# Patient Record
Sex: FEMALE | Race: BLACK OR AFRICAN AMERICAN | NOT HISPANIC OR LATINO | Employment: OTHER | URBAN - METROPOLITAN AREA
[De-identification: names, ages, dates, MRNs, and addresses within clinical notes are randomized per-mention and may not be internally consistent; named-entity substitution may affect disease eponyms.]

---

## 2021-03-19 ENCOUNTER — IMMUNIZATION (OUTPATIENT)
Dept: FAMILY MEDICINE | Facility: CLINIC | Age: 86
End: 2021-03-19
Payer: MEDICARE

## 2021-03-19 DIAGNOSIS — Z23 NEED FOR VACCINATION: Primary | ICD-10-CM

## 2021-03-19 PROCEDURE — 91300 COVID-19, MRNA, LNP-S, PF, 30 MCG/0.3 ML DOSE VACCINE: CPT | Mod: PBBFAC,PO

## 2021-04-09 ENCOUNTER — IMMUNIZATION (OUTPATIENT)
Dept: FAMILY MEDICINE | Facility: CLINIC | Age: 86
End: 2021-04-09
Payer: MEDICARE

## 2021-04-09 DIAGNOSIS — Z23 NEED FOR VACCINATION: Primary | ICD-10-CM

## 2021-04-09 PROCEDURE — 91300 COVID-19, MRNA, LNP-S, PF, 30 MCG/0.3 ML DOSE VACCINE: CPT | Mod: PBBFAC | Performed by: FAMILY MEDICINE

## 2021-04-09 PROCEDURE — 0002A COVID-19, MRNA, LNP-S, PF, 30 MCG/0.3 ML DOSE VACCINE: CPT | Mod: PBBFAC | Performed by: FAMILY MEDICINE

## 2022-01-26 ENCOUNTER — IMMUNIZATION (OUTPATIENT)
Dept: FAMILY MEDICINE | Facility: CLINIC | Age: 87
End: 2022-01-26
Payer: MEDICARE

## 2022-01-26 DIAGNOSIS — Z23 NEED FOR VACCINATION: Primary | ICD-10-CM

## 2022-01-26 PROCEDURE — 0004A COVID-19, MRNA, LNP-S, PF, 30 MCG/0.3 ML DOSE VACCINE: CPT | Mod: PBBFAC | Performed by: FAMILY MEDICINE

## 2024-07-25 ENCOUNTER — OFFICE VISIT (OUTPATIENT)
Dept: FAMILY MEDICINE | Facility: CLINIC | Age: 89
End: 2024-07-25
Payer: MEDICARE

## 2024-07-25 VITALS
HEIGHT: 65 IN | SYSTOLIC BLOOD PRESSURE: 138 MMHG | WEIGHT: 120.25 LBS | DIASTOLIC BLOOD PRESSURE: 62 MMHG | BODY MASS INDEX: 20.03 KG/M2 | HEART RATE: 71 BPM | OXYGEN SATURATION: 98 %

## 2024-07-25 DIAGNOSIS — K59.09 OTHER CONSTIPATION: ICD-10-CM

## 2024-07-25 DIAGNOSIS — F03.B4 MODERATE DEMENTIA WITH ANXIETY, UNSPECIFIED DEMENTIA TYPE: ICD-10-CM

## 2024-07-25 DIAGNOSIS — I10 ESSENTIAL HYPERTENSION: Primary | ICD-10-CM

## 2024-07-25 DIAGNOSIS — R41.3 MEMORY LOSS: ICD-10-CM

## 2024-07-25 DIAGNOSIS — E78.49 OTHER HYPERLIPIDEMIA: ICD-10-CM

## 2024-07-25 PROCEDURE — 99999 PR PBB SHADOW E&M-EST. PATIENT-LVL IV: CPT | Mod: PBBFAC,,, | Performed by: FAMILY MEDICINE

## 2024-07-25 RX ORDER — OXYBUTYNIN CHLORIDE 15 MG/1
15 TABLET, EXTENDED RELEASE ORAL
COMMUNITY
Start: 2024-05-18

## 2024-07-25 RX ORDER — DONEPEZIL HYDROCHLORIDE 5 MG/1
5 TABLET, FILM COATED ORAL
COMMUNITY
Start: 2024-05-20

## 2024-07-25 NOTE — PROGRESS NOTES
Assessment:       1. Essential hypertension    2. Other hyperlipidemia    3. Memory loss    4. Moderate dementia with anxiety, unspecified dementia type    5. Other constipation        Assessment & Plan  Essential hypertension: Stable    Other hyperlipidemia: New problem workup needed  -     Lipid Panel; Future; Expected date: 07/25/2024    Memory loss: New problem workup needed  -     Homocysteine, serum; Future; Expected date: 07/25/2024  -     Sedimentation rate; Future; Expected date: 07/25/2024  -     TSH; Future; Expected date: 07/25/2024  -     Vitamin B12; Future; Expected date: 07/25/2024  -     CBC Without Differential; Future; Expected date: 07/25/2024  -     Comprehensive Metabolic Panel; Future; Expected date: 07/25/2024  -     Vitamin B1; Future; Expected date: 07/25/2024  -     Folate; Future; Expected date: 07/25/2024  -     Treponema Pallidium Antibodies IgG, IgM; Future; Expected date: 07/25/2024    Moderate dementia with anxiety, unspecified dementia type: New problem workup needed  -     Ambulatory referral/consult to Home Health; Future; Expected date: 07/26/2024    Other constipation: New problem workup needed  -     linaCLOtide (LINZESS) 72 mcg Cap capsule; Take 1 capsule (72 mcg total) by mouth before breakfast.  Dispense: 30 capsule; Refill: 2    Other orders  -     (In Office Administered) Pneumococcal Conjugate Vaccine (20 Valent) (IM) (Preferred)         Her blood pressure, oxygen levels, and heart rate are within normal limits. She is due for a pneumonia vaccine. Pneumonia vaccine was administered today. Comprehensive blood work was ordered, including B12, folic acid, and thyroid function tests. She was advised to fast for at least 8 to 9 hours prior to the blood work. Home health therapy was suggested for her deconditioning, back pain, and memory issues. A  was suggested for her memory issues. Should her blood work return normal, an MRI or CT scan of the brain or CT scan  will be considered.  A small dosage of Linzess 72 mg once daily in the morning was prescribed. She was advised to increase her fiber intake and water intake.    Follow-up  A follow-up visit is scheduled for 3 months.          Patient agreed with assessment and plan. Patient verbalized understanding.     Subjective:       Patient ID: Anahy Stearns is a 89 y.o. female.    Chief Complaint: Establish Care    HPI  History of Present Illness  The patient is a 89-year-old female who presents for establishment of care. She is accompanied by her daughter.    She experiences intermittent headaches, which are relieved by aspirin. Additionally, she experiences lower back pain, which does not radiate. She occasionally experiences dizziness. Her memory is generally good, although she has difficulty recalling recent events. She occasionally experiences a sore throat and feels hot, though she does not have a fever. She prefers not to stay alone. Her back pain has been ongoing for approximately 2 years. She does not take Tylenol, but uses lidocaine patches as needed. Her water intake is insufficient. She has been experiencing memory issues for at least a year, but has not been formally diagnosed with dementia. She denies feeling sleepy or anxious. She occasionally experiences balance issues and uses a walker for support.    She takes milk of magnesia for constipation.    Supplemental Information  She was diagnosed with diabetes a while back.    SOCIAL HISTORY  She has never smoked. She never took drugs. She does not drink alcohol.       Past medical history, past social history, past surgical history, past Family history was reviewed and discussed with the patient's daughter.    Review of Systems   Constitutional:  Positive for activity change. Negative for appetite change.   HENT:  Negative for congestion and ear discharge.    Eyes:  Negative for discharge and itching.   Respiratory:  Negative for choking and chest tightness.     Cardiovascular:  Negative for chest pain and palpitations.   Gastrointestinal:  Positive for constipation. Negative for abdominal distention and abdominal pain.   Endocrine: Negative for cold intolerance and heat intolerance.   Genitourinary:  Negative for dysuria and flank pain.   Musculoskeletal:  Negative for back pain.   Skin:  Negative for pallor and rash.   Allergic/Immunologic: Negative for environmental allergies and food allergies.   Neurological:  Positive for dizziness and weakness. Negative for facial asymmetry and headaches.   Hematological:  Negative for adenopathy.   Psychiatric/Behavioral:  Positive for agitation, confusion, decreased concentration, dysphoric mood and sleep disturbance. The patient is nervous/anxious.        Objective:      Physical Exam  Vitals and nursing note reviewed.   Constitutional:       General: She is not in acute distress.     Appearance: Normal appearance. She is well-developed and normal weight. She is not diaphoretic.      Comments: The patient has limited ambulation, she is using a walker for mobilization.   HENT:      Head: Normocephalic and atraumatic.      Right Ear: External ear normal.      Left Ear: External ear normal.      Nose: Nose normal.      Mouth/Throat:      Pharynx: No oropharyngeal exudate.   Eyes:      General: No scleral icterus.        Right eye: No discharge.         Left eye: No discharge.      Conjunctiva/sclera: Conjunctivae normal.      Pupils: Pupils are equal, round, and reactive to light.   Cardiovascular:      Rate and Rhythm: Normal rate and regular rhythm.      Heart sounds: Normal heart sounds.   Pulmonary:      Effort: Pulmonary effort is normal. No respiratory distress.      Breath sounds: Normal breath sounds. No wheezing.   Abdominal:      General: Abdomen is flat. Bowel sounds are normal. There is no distension.      Palpations: Abdomen is soft.      Tenderness: There is no abdominal tenderness.   Musculoskeletal:         General:  No deformity.      Cervical back: Neck supple.   Neurological:      Mental Status: She is alert.      Cranial Nerves: No cranial nerve deficit.   Psychiatric:         Behavior: Behavior normal.         Thought Content: Thought content normal.         Cognition and Memory: Cognition is impaired. Memory is impaired. She exhibits impaired recent memory.         Judgment: Judgment normal.         Physical Exam  Vital Signs  Vitals show blood pressure at 138/62. Oxygen levels are normal. Heart rate is good. Height is 5 feet 5 inches. Weight is 54.5 kg, which is 120 pounds.     Results       This note was generated with the assistance of ambient listening technology. Verbal consent was obtained by the patient and accompanying visitor(s) for the recording of patient appointment to facilitate this note. I attest to having reviewed and edited the generated note for accuracy, though some syntax or spelling errors may persist. Please contact the author of this note for any clarification.

## 2024-07-26 ENCOUNTER — LAB VISIT (OUTPATIENT)
Dept: LAB | Facility: HOSPITAL | Age: 89
End: 2024-07-26
Attending: FAMILY MEDICINE
Payer: MEDICARE

## 2024-07-26 DIAGNOSIS — R41.3 MEMORY LOSS: ICD-10-CM

## 2024-07-26 DIAGNOSIS — E78.49 OTHER HYPERLIPIDEMIA: ICD-10-CM

## 2024-07-26 LAB
ALBUMIN SERPL BCP-MCNC: 3.8 G/DL (ref 3.5–5.2)
ALP SERPL-CCNC: 94 U/L (ref 55–135)
ALT SERPL W/O P-5'-P-CCNC: 13 U/L (ref 10–44)
ANION GAP SERPL CALC-SCNC: 9 MMOL/L (ref 8–16)
AST SERPL-CCNC: 24 U/L (ref 10–40)
BILIRUB SERPL-MCNC: 0.6 MG/DL (ref 0.1–1)
BUN SERPL-MCNC: 10 MG/DL (ref 8–23)
CALCIUM SERPL-MCNC: 10.1 MG/DL (ref 8.7–10.5)
CHLORIDE SERPL-SCNC: 107 MMOL/L (ref 95–110)
CHOLEST SERPL-MCNC: 263 MG/DL (ref 120–199)
CHOLEST/HDLC SERPL: 3.3 {RATIO} (ref 2–5)
CO2 SERPL-SCNC: 27 MMOL/L (ref 23–29)
CREAT SERPL-MCNC: 0.8 MG/DL (ref 0.5–1.4)
ERYTHROCYTE [DISTWIDTH] IN BLOOD BY AUTOMATED COUNT: 15.1 % (ref 11.5–14.5)
ERYTHROCYTE [SEDIMENTATION RATE] IN BLOOD BY PHOTOMETRIC METHOD: 5 MM/HR (ref 0–36)
EST. GFR  (NO RACE VARIABLE): >60 ML/MIN/1.73 M^2
FOLATE SERPL-MCNC: 13.3 NG/ML (ref 4–24)
GLUCOSE SERPL-MCNC: 81 MG/DL (ref 70–110)
HCT VFR BLD AUTO: 43.7 % (ref 37–48.5)
HCYS SERPL-SCNC: 8.4 UMOL/L (ref 4–15.5)
HDLC SERPL-MCNC: 80 MG/DL (ref 40–75)
HDLC SERPL: 30.4 % (ref 20–50)
HGB BLD-MCNC: 13.1 G/DL (ref 12–16)
LDLC SERPL CALC-MCNC: 160.8 MG/DL (ref 63–159)
MCH RBC QN AUTO: 28.5 PG (ref 27–31)
MCHC RBC AUTO-ENTMCNC: 30 G/DL (ref 32–36)
MCV RBC AUTO: 95 FL (ref 82–98)
NONHDLC SERPL-MCNC: 183 MG/DL
PLATELET # BLD AUTO: 268 K/UL (ref 150–450)
PMV BLD AUTO: 10.7 FL (ref 9.2–12.9)
POTASSIUM SERPL-SCNC: 5.2 MMOL/L (ref 3.5–5.1)
PROT SERPL-MCNC: 7 G/DL (ref 6–8.4)
RBC # BLD AUTO: 4.59 M/UL (ref 4–5.4)
SODIUM SERPL-SCNC: 143 MMOL/L (ref 136–145)
TREPONEMA PALLIDUM IGG+IGM AB [PRESENCE] IN SERUM OR PLASMA BY IMMUNOASSAY: NONREACTIVE
TRIGL SERPL-MCNC: 111 MG/DL (ref 30–150)
TSH SERPL DL<=0.005 MIU/L-ACNC: 0.47 UIU/ML (ref 0.4–4)
VIT B12 SERPL-MCNC: 604 PG/ML (ref 210–950)
WBC # BLD AUTO: 6.62 K/UL (ref 3.9–12.7)

## 2024-07-26 PROCEDURE — 84443 ASSAY THYROID STIM HORMONE: CPT | Performed by: FAMILY MEDICINE

## 2024-07-26 PROCEDURE — 36415 COLL VENOUS BLD VENIPUNCTURE: CPT | Mod: PO | Performed by: FAMILY MEDICINE

## 2024-07-26 PROCEDURE — 80061 LIPID PANEL: CPT | Performed by: FAMILY MEDICINE

## 2024-07-26 PROCEDURE — 80053 COMPREHEN METABOLIC PANEL: CPT | Performed by: FAMILY MEDICINE

## 2024-07-26 PROCEDURE — 86593 SYPHILIS TEST NON-TREP QUANT: CPT | Performed by: FAMILY MEDICINE

## 2024-07-26 PROCEDURE — 82607 VITAMIN B-12: CPT | Performed by: FAMILY MEDICINE

## 2024-07-26 PROCEDURE — 85652 RBC SED RATE AUTOMATED: CPT | Performed by: FAMILY MEDICINE

## 2024-07-26 PROCEDURE — 82746 ASSAY OF FOLIC ACID SERUM: CPT | Performed by: FAMILY MEDICINE

## 2024-07-26 PROCEDURE — 85027 COMPLETE CBC AUTOMATED: CPT | Performed by: FAMILY MEDICINE

## 2024-07-26 PROCEDURE — 84425 ASSAY OF VITAMIN B-1: CPT | Performed by: FAMILY MEDICINE

## 2024-07-26 PROCEDURE — 83090 ASSAY OF HOMOCYSTEINE: CPT | Performed by: FAMILY MEDICINE

## 2024-07-27 PROCEDURE — G0180 MD CERTIFICATION HHA PATIENT: HCPCS | Mod: ,,, | Performed by: FAMILY MEDICINE

## 2024-07-29 ENCOUNTER — TELEPHONE (OUTPATIENT)
Dept: FAMILY MEDICINE | Facility: CLINIC | Age: 89
End: 2024-07-29
Payer: MEDICARE

## 2024-07-29 DIAGNOSIS — R41.3 OTHER AMNESIA: Primary | ICD-10-CM

## 2024-07-29 DIAGNOSIS — F03.B4 MODERATE DEMENTIA WITH ANXIETY, UNSPECIFIED DEMENTIA TYPE: ICD-10-CM

## 2024-07-29 DIAGNOSIS — R41.3 MEMORY LOSS: ICD-10-CM

## 2024-07-29 NOTE — TELEPHONE ENCOUNTER
Please notify patient's daughter that the results of the blood work showed normal hemoglobin, the potassium levels were slightly high.  Will recheck at her next office visit.    Cholesterol levels were high, I will recommend healthy habits, decrease sugar intake, eat more vegetables, more salads, drink more water.  Inflammatory levels were in good range.  I will recommend an MRI of the brain secondary to the patient having worsening memory problems.  Neurology and neuropsychology evaluation.  Please if you can schedule the patient.  Thank you.

## 2024-07-30 LAB — VIT B1 BLD-MCNC: 68 UG/L (ref 38–122)

## 2024-08-15 ENCOUNTER — EXTERNAL HOME HEALTH (OUTPATIENT)
Dept: HOME HEALTH SERVICES | Facility: HOSPITAL | Age: 89
End: 2024-08-15
Payer: MEDICARE

## 2024-08-22 ENCOUNTER — HOSPITAL ENCOUNTER (OUTPATIENT)
Dept: RADIOLOGY | Facility: HOSPITAL | Age: 89
Discharge: HOME | End: 2024-08-22
Attending: FAMILY MEDICINE
Payer: MEDICARE

## 2024-08-22 DIAGNOSIS — R41.3 OTHER AMNESIA: ICD-10-CM

## 2024-08-22 DIAGNOSIS — R41.3 MEMORY LOSS: Primary | ICD-10-CM

## 2024-08-22 PROCEDURE — 70551 MRI BRAIN STEM W/O DYE: CPT | Mod: 26,,, | Performed by: RADIOLOGY

## 2024-08-22 PROCEDURE — 70551 MRI BRAIN STEM W/O DYE: CPT | Mod: TC,PO

## 2024-09-09 ENCOUNTER — TELEPHONE (OUTPATIENT)
Dept: NEUROLOGY | Facility: CLINIC | Age: 89
End: 2024-09-09
Payer: MEDICARE

## 2024-09-30 NOTE — PROGRESS NOTES
NEUROPSYCHOLOGY CONSULT    Referral Information  Name: Anahy Stearns  MRN: [id number]  Age: 89 y.o.    : 3/15/1934  Race: Black or  Education: 16 years   Gender: Female   Handedness: Right     Referring Provider: Dr. Ashley Reid  Referral Reason/Medical Necessity: Neuropsychological evaluation to assess current cognitive functioning, aid in differential diagnosis, and provide treatment recommendations in the context of memory decline.  Consent/Emergency Plan: The patient expressed an understanding of the purpose of the evaluation and consented to all procedures. I informed the patient of limits to confidentiality and discussed an emergency plan.  Visit Type: In-person interview, testing, and treatment plan on 2024.   Sources of Information: The following was gathered from a clinical interview with Ms. Stearns, her daughter (Chaz Christiansen) and granddaughter (Waqar Dejesus) in a separate interview with her permission, and review of available medical records.  Billing table provided at the end of this note.      SUMMARY/TREATMENT PLAN   Results from the evaluation indicate the following diagnoses and treatment plan recommendations. The patient is likely able to follow a treatment plan with help from family.    Ms. Stearns is a 89 y.o. female with history of hypertension, hyperlipidemia, headaches. She is referred for a neuropsychological evaluation in the context of progressive memory decline with onset predating . Her family reports rapid forgetting, difficulty recognizing family members, day/night confusion, hallucinations (sees her  sisters and various children), delusions (believes people are stealing from her). She requires assistance for basic and instrumental ADLs. Brain MRI shows generalized volume loss. She is prescribed Aricept.     Premorbid abilities are estimated to be in the average range. She displays diffuse cognitive impairments in domains of memory (with  minimal benefit from cues or recognition format), naming and semantic fluency, visuoconstruction, and executive functioning. Temporal orientation is impaired. Attention, simple processing speed, and letter fluency are within normal limits. She denies elevated mood symptoms on questionnaires, although her family notices frequent dysphoria and agitation.     Her clinical presentation and cognitive profile are unfortunately suggestive of Alzheimer's disease. She meets criteria for major neurocognitive disorder, moderate severity, with behavioral disturbance (agitation, hallucinations). She requires 24-hour supervision. There are safety concerns (leaving the house unattended, poor judgment and falls) that indicate a higher level of care is required and assistance is needed for her caregivers. It is possible that high risk medications are exacerbating deficits (oxybutynin and gabapentin), which warrants medication review.     PLAN & RECOMMENDATIONS    Medical Follow-Up: Continue usual follow up for current active medical issues.     Medication review. It may be worthwhile to reconcile medication and review for high-risk medications.    Recommend establishing care with Palliative Medicine.     Level of care: 24-hour supervision. Her family needs more assistance (e.g. sitters) and may wish to consider long term care residences with memory care. Family will be referred to Social Work for care management recommendation/needs. In the interim, safety risks should be addressed. Family should consider installing inside locks on the doors to prevent wandering out and/or alarms on the door to notify her great granddaughter when she attempts to leave. Given history of falls and poor judgment, a LifeAlert necklace or equivalent device is recommended.     Mood management. The most common treatment for depression in dementia involves a combination of medicine and non-pharmacological approaches. The following are behavioral strategies  recommended by the Alzheimer's Association (www.alz.org):   Schedule a predictable daily routine, taking advantage of the person's best time of day to undertake difficult tasks, such as bathing  Make a list of activities, people or places that the person enjoys and schedule these things more frequently  Help the person exercise regularly, particularly in the morning  Acknowledge the person's frustration or sadness, while continuing to express hope that he or she will feel better soon  Celebrate small successes and occasions  Find ways that the person can contribute to family life and be sure to recognize his or her contributions  Provide reassurance that the person is loved, respected and appreciated as part of the family, and not just for what she or he can do now  Nurture the person with offers of favorite foods or soothing or inspirational activities  Reassure the person that he or she will not be abandoned  Distraction and redirection to a new activity can help shift the focus away from negative emotions in moments of distress. By redirecting attention to a different activity or topic, caregivers can create a more positive and calming environment. Invite them to engage in an activity they enjoy or take a walk with you outside.    Practice good sleep hygiene. Tips to help with day/night confusion:  Help her maintain a consistent sleep/wake schedule.  Remind her to drink water earlier in the evening. Avoid drinking anything before bed.   Avoid spending time in bed during the day.  Ensure exposure to natural sunlight early in the day.  Maintain a cool and dark atmosphere in the bedroom.    Support groups for caregivers. Care partners can contact Aly Waters MA, MBA ([email or web address]) or Lashell Grijalva LCSW ([email or web address]) to learn more about [facility] Dementia Education Series via Zoom; Dementia Caregiver Support Group via Zoom; or volunteer-led in-person Dementia Caregiver Support  "Group.    Caregivers can optimize communication with the following strategies:  Unless there is an immediate safety issue, there is no need to challenge or correct your loved one. For example, if she is hallucinating, do not argue that what they are seeing is not real. If they are expressing paranoia, empathize gently with their fear, and attempt to redirect them to a different activity.  Avoid saying things like, "We already went over this!" "You can't keep doing this." "I already explained this to you"  Instead, simply nod calmly and acknowledge what the person is saying ("Yes, that makes sense."), and then request their help or company in a different behavior.   Keep communication simple and clear  If a statement is not understood, try rephrasing the statement in fewer words  Use close-ended questions that can be answered with yes or no   Avoid challenging her memory difficulties or trying to convince her when she is wrong  If she becomes upset or agitated, acknowledge her feelings and try to change the subject or direct her attention to a new activity  Respond with reassurance, using a calm voice and positive body language.    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 Stearns's care.  If you have any questions, please contact me at [phone].    Lisa Alonzo, Ph.D.  Licensed Clinical Neuropsychologist  [facility] - Department of Neurology    CLINICAL INTERVIEW & RECORD REVIEW   COGNITIVE SYMPTOMS & HISTORY OF PRESENT ILLNESS  Ms. " Daryn admits that she forgets numbers and names but denies concerns. Her daughter and granddaughter became more alarmed by memory issues in , although onset was before this with a gradual course. Memory deficits have progressed in the past three years. In the past year, she has tended to forget outings that occurred earlier in the day. Since December of last year, she has had trouble consistently recognizing her granddaughter and daughter. She thinks her great granddaughter who lives with her is still a teenager. She sometimes believes she is in her 60s. She moved in with her daughter briefly in 2024 but would repeatedly get lost in the house and became more confused. Family eventually moved her back to her home. She believes her  sisters are still alive and can take care of her. She can use a phone to call her sister only, because it is the most recent call. However, she does not put the phone to her ear when using it. She is never home by herself.     Safety concerns: Her daughter lives a block away and she will walk to her daughter's house before her great granddaughter realizes she left. Neighbors typically recognize her and walk with her or call her family. Last week she went to a park with her family and believed she could dance without her walker. This resulted in one fall and a couple near falls.     ACTIVITIES OF DAILY LIVING  Basic ADLs: She brushes her teeth independently. She refuses to bathe in the bathtub (they have a seat and bars for her) and instead uses a towel, soap, and washable cloth. Sometimes she will put clothes on backwards. She requires prompting to eat and often refuses food. She will say that she has already eaten and is full (when she did not eat). She may refuse to change out of her pajamas when they offer to take her somewhere.  Medications: Her family has managed her medication for the past 1-2 years.   Appointments/Schedule: Her family manages.   Finances: Her  granddaughter took this over two years ago.    Cooking: Her family fixes everything for her. She does not use the microwave.   Shopping/Household:  Family manages. She refuses to use the air conditioner in the summer.   Driving: Family has provided transportation for many years.     PHYSICAL SYMPTOMS  She uses a walker (for the last 2-3 years). Denies tremor. Denies trouble with hearing. Vision is reduced but wears glasses. She has chronic back pain which was aggravated from a recent fall, taking ibuprofen. She is taking gabapentin 300mg for many years. She has urinary incontinence. Denies UTIs. She is taking oxybutynin.     MOOD/PSYCHIATRIC HISTORY  Mood: She reports good mood. She worries about her back. Family notes that she cries throughout the day for the past year. She gets upset that her family does not take her anywhere. She gets agitated and can become mean, which was never her personality before. Her temperament changed last year.   Behavior: She will hide things around the house and will say that someone is coming to the house and stealing from her. This began last summer/. All her sisters are  and she sees them and speaks to them. Sometimes she realizes they are . She has frequently commented that there are many children in her house when it is only her and her great granddaughter.   Neurovegetative: She reports well, but her family notes that she wakes up at night and may walk around. She confuses day/night. Sometimes she will nap off and on all day. Appetite is reduced. She frequently says she is full and declines food.   Suicidal/Homicidal Ideation: Denied.     SOCIAL HISTORY AND HEALTH BEHAVIOR  Family Status: .  passed in . She has 7 sons and 1 daughter. Her daughter has POA.   Current Living Situation: Her 28-year old great granddaughter lives with her.   Primary Source of Support: Daughters, granddaughter, great granddaughter.   Daily Activities: Television.  Someimtes she watches TV in the dark the entire day.   Developmental History: Born in [place]. No gestational or later developmental concerns.  Academic History: No reported history of learning, attention, or behavioral difficulties. No grade retention.  Education Level: Obtained a degree in accounting. Her family believes it was a Bachelor's degree. The patient does not recall.   Occupational Status and History: Retired. Nursing,  for [employer].   Exercise: None.   Substance Use:   Alcohol: None.   Cigarettes/tobacco: None.     FAMILY HISTORY  family history includes Diabetes in her sister; Heart disease in her sister; Kidney failure in her sister.    MEDICAL STATUS  Patient Active Problem List   Diagnosis    Cholelithiasis     Past Medical History:   Diagnosis Date    Dementia     Hyperlipidemia     Hypertension      Past Surgical History:   Procedure Laterality Date    BACK SURGERY      BUNIONECTOMY       SECTION      CHOLECYSTECTOMY      HYSTERECTOMY      TONSILLECTOMY         RELEVANT NEUROLOGIC HISTORY  Falls: She had a couple falls a week ago. Had a fall at a park and was dancing with her walker and she fell backward. Reports that she also fell at home trying to move her rocking chair and hurt her back. Denies head impact.   TBI: None reported.  Seizures:  None.   Stroke: She and her daughters do not recall history of stroke. MRI shows remote lacunar infarct in the right thalamus.     NEURODIAGNOSTICS  Results for orders placed or performed during the hospital encounter of 24   MRI Brain Without Contrast    Narrative    EXAM:  MRI BRAIN WITHOUT CONTRAST    CLINICAL HISTORY:  Memory loss; Other amnesia.    COMPARISON:  None.    FINDINGS:  Intracranial contents:There is no acute abnormality.  There is no hemorrhage.  There is no mass.  There are no regions of restricted diffusion to suggest acute infarction.  There is generalized volume loss and mild nonspecific white  "matter change.  There is no abnormal extra-axial fluid collection.  There is a remote lacunar infarction in the right thalamus.  The basilar cisterns are open.  Flow voids indicating patency are present in the major vessels at the base of the brain.  The cerebellar tonsils are normal position.  Sellar structures are normal.  The patient has had bilateral lens replacement surgery.  The orbits are otherwise grossly normal.    Extracranial contents, calvarium, soft tissues:Baseline marrow signal is normal.  The paranasal sinuses and mastoid air cells are grossly clear.      Impression    1. There is generalized volume loss with only mild nonspecific white matter change.  There is no hemorrhage, mass or acute infarction      Electronically signed by: Aftab Dunham MD  Date:    08/22/2024  Time:    12:20       RECENT LABS  Lab Results   Component Value Date    [id number] 604 07/26/2024     No results found for: "RPR"  Lab Results   Component Value Date    FOLATE 13.3 07/26/2024     Lab Results   Component Value Date    TSH 0.471 07/26/2024     No results found for: "LABA1C", "HGBA1C"  No results found for: "HIV1X2", "[id number]"    CURRENT MEDICATIONS  Ms. Stearns has a current medication list which includes the following prescription(s): albuterol, amlodipine, aspirin, donepezil, gabapentin, linaclotide, omeprazole, oxybutynin, oxybutynin, and ramipril.     MENTAL STATUS AND OBSERVATIONS  Appearance: Casually dressed and adequate grooming/hygiene.   Alertness/orientation: Attentive and alert. She correctly identified the day of the week but was not oriented to year ("1920"), month, date, or season ("summer"). She was oriented to city but not building or floor.   Gait/motor: Presented in a wheelchair.   Sensory: She wore glasses. Vision and hearing appeared adequate for testing purposes.   Speech/language: Normal in rate, rhythm, tone, and volume. No significant word finding difficulty noted. Expressive and " "receptive language was normal.  Stated mood/affect: The patients stated mood was "good." She appeared impatient and eager to leave.   Interpersonal behavior: Rapport was established with reasonable effort.   Thought processes: Answered questions in a goal-directed manner, although her family frequently corrected her responses during the interview. For instance, she could not accurately recall her highest level of education (stated "high school," but daughter reports she earned a degree).   Test taking behavior and validity:   After approximately 45 minutes, she began to show an irritable edge, though she completed the evaluation.  Throughout the evaluation, she repetitively said she was not thinking straight and was not concentrating because she did not expect "all this."  She at times gave up easily, though she responded well to encouragement.   Scores on stand-alone and embedded performance validity measures were within expectation.  The current results are considered a valid reflection of the patient's current functioning.     PROCEDURES & RESULTS   In addition to performing a review of pertinent medical records, reviewing limits to confidentiality, conducting a clinical interview, and explaining procedures, the following measures were administered: Mini-Mental State Examination (MMSE); Wide Range Achievement Test, Fifth Edition (WRAT-5) Reading; [place] Verbal Learning Test, Second Edition (CVLT-II) short form; CERAD Constructional Praxis and Praxis Recall (Fillenbaum et al., 2011 norms); [place] Naming Test 15-item; Letter F and Animal Fluency; Clock Drawing Test; Wechsler Adult Intelligence Scale, Third Edition (WAIS-III) Digit Span; Trail Making Test; Geriatric Depression Scale (GDS-15); Generalized Anxiety Disorder Assessment (LUIS ANGEL-7); Test administration modifications and norms were based on Mary et al., 2019 norms unless otherwise specified.     TEST RESULTS  Performance is exceptionally low on a " cognitive screening measure (MMSE 17/30). Missed points on orientation, backwards spelling, repetition, following instructions, figure copy, and recall. Baseline cognitive function is estimated to be in the average range based on educational/occupational attainment. Immediate auditory attention is average. Working memory in the form of repetition of numbers backwards is exceptionally low. Visual tracking speed is exceptionally high. On a mental set shifting task with numbers and letters, she was unable to establish mental set to complete the sample items. Clock drawing to command is notable for errors in spatial arrangement of numbers of incorrect hand placement. Word reading is exceptionally low. Picture naming is below average with two perceptual errors and otherwise semantic errors. Semantic fluency is below average with frequent repetition errors. Letter fluency is low average. Copy of a clock is intact. Copy of a geometric figures is exceptionally low due to difficulty with overlapping rectangles and the necker cube. Learning efficiency is exceptionally low on a 9-item word list. She recalled two after a brief distractor task (exceptionally low) but could not recall any words after a 10-minute delay. Recognition discriminability is reduced (7 hits; 5 false positive errors). She recalled one figure she copied previously. Responses on self-report mood inventories suggest the absence of clinically significant symptoms of depression or anxiety.     PREMORBID FUNCTIONING Raw Score Standardized Score Percentile/CP Descriptor   WRAT-5 Reading 32 64.00 1 Exceptionally Low    COGNITIVE SCREENING Raw Score Standardized Score Percentile/CP Descriptor   MMSE 17 -5.00 - Exceptionally Low    Orientation - Place 0 - - -   Orientation - Date 0 - - -   LANGUAGE FUNCTIONING Raw Score Standardized Score Percentile/CP Descriptor   BNT-15 8 -1.59 6 Below Average   Letter F  8 -1.00 16 Low Average   Animal Naming 6 -2 3 Below  Average   VISUOSPATIAL FUNCTIONING Raw Score Standardized Score Percentile/CP Descriptor   Clock Request 5 - 100 WNL   CERAD Constructional Praxis 5 -5 0 Exceptionally Low    LEARNING & MEMORY Raw Score Standardized Score Percentile/CP Descriptor   CVLT-II Short        Trials 1-4  15 -2.9 - Exceptionally Low    Trial 1 2 -2.0 2 Below Average   Trial 4 5 -2.7 0 Exceptionally Low    SDFR 2 -4.1 0 Exceptionally Low    LDFR 0 -4.4 0 Exceptionally Low    LDCR 0 -4.5 0 Exceptionally Low    Recognition Hits 7 -1.2 12 Low Average   False Positives 5 3.4 100 Exceptionally High   Forced Choice 9 - - -   CERAD Praxis Recall 3 -2.03 2 Exceptionally Low    ATTENTION/WORKING MEMORY Raw Score Standardized Score Percentile/CP Descriptor   WAIS-III Digit Span 13 -0.44 33 Average         DS Forward 10 0.35 64 Average         DS Backward 3 -2.82 0 Exceptionally Low    MENTAL PROCESSING SPEED Raw Score Standardized Score Percentile/CP Descriptor   TMT A  114 2 98 Exceptionally High   TMT A errors 1 - - -   EXECUTIVE FUNCTIONING Raw Score Standardized Score Percentile/CP Descriptor   TMT B unable N/A N/A N/A   TMT B errors N/A - - -   Clock Request 5 0.59 44 WNL   MOOD & PERSONALITY Raw Score Standardized Score Percentile/CP Descriptor   GDS-15 2 - - WNL   LUIS ANGEL-7 0 - - WNL       It is important to note that scores/percentiles should only be interpreted by a neuropsychologist. It is common for healthy individuals to have 1-3 isolated low/unusual scores that are not indicative of any significant cognitive dysfunction.    BILLING     Service Description CPT Code Minutes Units   Psychiatric diagnostic evaluation by physician [id number]  0   Neurobehavioral status exam by physician [id number] 1   Each additional hour by physician [id number]  0   Test Evaluation Services --  --   Neuropsychological testing evaluation services by physician [id number] [phone] 1   Each additional hour by physician [id number] 60 1   Test Administration and Scoring --  --   Psychological or  neuropsychological test administration and scoring by physician [id number]  0   Each additional 30 minutes by physician [id number]  0   Psychological or neuropsychological test administration and scoring by technician [id number] 110 1   Each additional 30 minutes by technician [id number]  3

## 2024-10-01 ENCOUNTER — OFFICE VISIT (OUTPATIENT)
Dept: NEUROLOGY | Facility: CLINIC | Age: 89
End: 2024-10-01
Payer: MEDICARE

## 2024-10-01 DIAGNOSIS — R41.3 OTHER AMNESIA: ICD-10-CM

## 2024-10-01 DIAGNOSIS — G30.9 MAJOR NEUROCOGNITIVE DISORDER DUE TO ALZHEIMER DISEASE, WITH BEHAVIORAL DISTURBANCE: Primary | ICD-10-CM

## 2024-10-01 DIAGNOSIS — F03.B4 MODERATE DEMENTIA WITH ANXIETY, UNSPECIFIED DEMENTIA TYPE: ICD-10-CM

## 2024-10-01 DIAGNOSIS — R41.3 MEMORY LOSS: ICD-10-CM

## 2024-10-01 DIAGNOSIS — F02.818 MAJOR NEUROCOGNITIVE DISORDER DUE TO ALZHEIMER DISEASE, WITH BEHAVIORAL DISTURBANCE: Primary | ICD-10-CM

## 2024-10-01 PROCEDURE — 99999 PR PBB SHADOW E&M-EST. PATIENT-LVL II: CPT | Mod: PBBFAC,,, | Performed by: STUDENT IN AN ORGANIZED HEALTH CARE EDUCATION/TRAINING PROGRAM

## 2024-10-03 ENCOUNTER — OFFICE VISIT (OUTPATIENT)
Dept: NEUROLOGY | Facility: CLINIC | Age: 89
End: 2024-10-03
Payer: MEDICARE

## 2024-10-03 DIAGNOSIS — G30.9 MAJOR NEUROCOGNITIVE DISORDER DUE TO ALZHEIMER DISEASE, WITH BEHAVIORAL DISTURBANCE: Primary | ICD-10-CM

## 2024-10-03 DIAGNOSIS — F02.818 MAJOR NEUROCOGNITIVE DISORDER DUE TO ALZHEIMER DISEASE, WITH BEHAVIORAL DISTURBANCE: Primary | ICD-10-CM

## 2024-10-03 PROCEDURE — 99499 UNLISTED E&M SERVICE: CPT | Mod: S$GLB,,, | Performed by: STUDENT IN AN ORGANIZED HEALTH CARE EDUCATION/TRAINING PROGRAM

## 2024-10-03 NOTE — PROGRESS NOTES
NEUROPSYCHOLOGICAL EVALUATION FEEDBACK    Referral Information  Name: Anahy Stearns  MRN: [id number]  Age: 89 y.o.    : 3/15/1934  Race: Black or  Gender: female        Chief complaint leading to consultation/medical necessity: Feedback from neuropsychological evaluation.  Visit type: In person feedback  Total time spent with patient: 30 minutes.  Billin attached to testing visit on 10/01/2024.  Consent/Emergency Plan: The patient expressed an understanding of the purpose of the evaluation and consented to all procedures. I informed the patient of limits to confidentiality and discussed an emergency plan.    FEEDBACK NOTE       Ms. Anahy Stearns, her daughter, and her granddaughter participated in a feedback session today.  We discussed the results of the neuropsychological evaluation. I gave time to discuss questions and concerns. A copy of a evaluation report was provided to Ms. Stearns.     Lisa Alonzo, Ph.D.  Licensed Clinical Neuropsychologist  [facility] - Department of Neurology

## 2024-10-04 ENCOUNTER — OUTPATIENT CASE MANAGEMENT (OUTPATIENT)
Dept: NEUROLOGY | Facility: CLINIC | Age: 89
End: 2024-10-04
Payer: MEDICARE

## 2024-10-04 ENCOUNTER — TELEPHONE (OUTPATIENT)
Dept: NEUROLOGY | Facility: CLINIC | Age: 89
End: 2024-10-04
Payer: MEDICARE

## 2024-10-04 NOTE — TELEPHONE ENCOUNTER
----- Message from Lisa Alonzo sent at 10/4/2024  3:32 PM CDT -----  Regarding: Neurology referral  Balwinder Joseph,    I evaluated Ms. Stearns recently who I suspect has moderate dementia due to Alzheimer's disease. Her PCP referred her to Neurology in July and the patient's caregivers mentioned they had not heard about scheduling. I know you all have a long wait list as well - sending this message to make sure she doesn't fall through the cracks.     Best,  Lisa

## 2024-10-04 NOTE — TELEPHONE ENCOUNTER
Called patients daughter to schedule appointment for dementia. No answer. Left message to return call.

## 2024-10-04 NOTE — PROGRESS NOTES
Social Work - Dementia Care Management:    Referral received 10/3/24 from Dr. Alonzo to assess care management needs. Phoned caregiver, daughter Jada; left voice mail.

## 2024-10-08 ENCOUNTER — TELEPHONE (OUTPATIENT)
Dept: NEUROLOGY | Facility: CLINIC | Age: 89
End: 2024-10-08
Payer: MEDICARE

## 2024-10-08 NOTE — TELEPHONE ENCOUNTER
Spoke with patients daughter and scheduled new patient memory appointment for 2/17/25 with Elizabeth Dumont. Time/date/location provided.

## 2024-10-11 ENCOUNTER — OUTPATIENT CASE MANAGEMENT (OUTPATIENT)
Dept: NEUROLOGY | Facility: CLINIC | Age: 89
End: 2024-10-11
Payer: MEDICARE

## 2024-10-11 NOTE — PROGRESS NOTES
Social Work - Dementia Care Management:    Second attempt to reach caregiver, daughter Jada, to offer Dementia Care Management services; left voice mail.

## 2024-10-16 PROBLEM — R51.9 CHRONIC HEADACHES: Status: ACTIVE | Noted: 2024-10-16

## 2024-10-16 PROBLEM — R29.6 RECURRENT FALLS WHILE WALKING: Status: ACTIVE | Noted: 2024-10-16

## 2024-10-16 PROBLEM — Z75.8 DISCHARGE PLANNING ISSUES: Status: ACTIVE | Noted: 2024-10-16

## 2024-10-16 PROBLEM — Z71.89 ADVANCED CARE PLANNING/COUNSELING DISCUSSION: Status: ACTIVE | Noted: 2024-10-16

## 2024-10-16 PROBLEM — G89.29 CHRONIC HEADACHES: Status: ACTIVE | Noted: 2024-10-16

## 2024-10-16 PROBLEM — M54.50 ACUTE LOW BACK PAIN: Status: ACTIVE | Noted: 2024-10-16

## 2024-10-17 PROBLEM — Z74.09 OTHER REDUCED MOBILITY: Status: ACTIVE | Noted: 2024-10-17

## 2024-10-23 PROCEDURE — G0180 MD CERTIFICATION HHA PATIENT: HCPCS | Mod: ,,, | Performed by: FAMILY MEDICINE

## 2024-10-28 ENCOUNTER — E-CONSULT (OUTPATIENT)
Dept: ENDOCRINOLOGY | Facility: CLINIC | Age: 89
End: 2024-10-28
Payer: MEDICARE

## 2024-10-28 ENCOUNTER — OFFICE VISIT (OUTPATIENT)
Dept: FAMILY MEDICINE | Facility: CLINIC | Age: 89
End: 2024-10-28
Payer: MEDICARE

## 2024-10-28 VITALS
HEART RATE: 70 BPM | BODY MASS INDEX: 19.98 KG/M2 | SYSTOLIC BLOOD PRESSURE: 100 MMHG | WEIGHT: 119.94 LBS | RESPIRATION RATE: 18 BRPM | HEIGHT: 65 IN | OXYGEN SATURATION: 97 % | DIASTOLIC BLOOD PRESSURE: 58 MMHG

## 2024-10-28 DIAGNOSIS — E78.49 OTHER HYPERLIPIDEMIA: ICD-10-CM

## 2024-10-28 DIAGNOSIS — G47.09 OTHER INSOMNIA: ICD-10-CM

## 2024-10-28 DIAGNOSIS — Z23 IMMUNIZATION DUE: ICD-10-CM

## 2024-10-28 DIAGNOSIS — Z91.81 AT RISK FOR FALLING: ICD-10-CM

## 2024-10-28 DIAGNOSIS — M25.552 BILATERAL HIP PAIN: ICD-10-CM

## 2024-10-28 DIAGNOSIS — R93.5 ABNORMAL CT SCAN, PELVIS: Primary | ICD-10-CM

## 2024-10-28 DIAGNOSIS — I10 ESSENTIAL HYPERTENSION: Primary | ICD-10-CM

## 2024-10-28 DIAGNOSIS — R45.1 AGITATION: ICD-10-CM

## 2024-10-28 DIAGNOSIS — M25.551 BILATERAL HIP PAIN: ICD-10-CM

## 2024-10-28 DIAGNOSIS — R54 FRAIL ELDERLY: ICD-10-CM

## 2024-10-28 DIAGNOSIS — M89.9 BONE DISEASE: ICD-10-CM

## 2024-10-28 DIAGNOSIS — M88.9 PAGET DISEASE OF BONE: ICD-10-CM

## 2024-10-28 DIAGNOSIS — R74.8 ALKALINE PHOSPHATASE ELEVATION: ICD-10-CM

## 2024-10-28 DIAGNOSIS — D64.9 ANEMIA, UNSPECIFIED TYPE: ICD-10-CM

## 2024-10-28 PROCEDURE — 99999 PR PBB SHADOW E&M-EST. PATIENT-LVL IV: CPT | Mod: PBBFAC,,, | Performed by: FAMILY MEDICINE

## 2024-10-28 PROCEDURE — 90653 IIV ADJUVANT VACCINE IM: CPT | Mod: S$GLB,,, | Performed by: FAMILY MEDICINE

## 2024-10-28 PROCEDURE — 1126F AMNT PAIN NOTED NONE PRSNT: CPT | Mod: CPTII,S$GLB,, | Performed by: FAMILY MEDICINE

## 2024-10-28 PROCEDURE — G2211 COMPLEX E/M VISIT ADD ON: HCPCS | Mod: S$GLB,,, | Performed by: FAMILY MEDICINE

## 2024-10-28 PROCEDURE — 1159F MED LIST DOCD IN RCRD: CPT | Mod: CPTII,S$GLB,, | Performed by: FAMILY MEDICINE

## 2024-10-28 PROCEDURE — 99214 OFFICE O/P EST MOD 30 MIN: CPT | Mod: S$GLB,,, | Performed by: FAMILY MEDICINE

## 2024-10-28 PROCEDURE — G0008 ADMIN INFLUENZA VIRUS VAC: HCPCS | Mod: S$GLB,,, | Performed by: FAMILY MEDICINE

## 2024-10-28 PROCEDURE — 3288F FALL RISK ASSESSMENT DOCD: CPT | Mod: CPTII,S$GLB,, | Performed by: FAMILY MEDICINE

## 2024-10-28 PROCEDURE — 99451 NTRPROF PH1/NTRNET/EHR 5/>: CPT | Mod: S$GLB,,, | Performed by: STUDENT IN AN ORGANIZED HEALTH CARE EDUCATION/TRAINING PROGRAM

## 2024-10-28 PROCEDURE — 1101F PT FALLS ASSESS-DOCD LE1/YR: CPT | Mod: CPTII,S$GLB,, | Performed by: FAMILY MEDICINE

## 2024-10-28 RX ORDER — DICLOFENAC SODIUM 10 MG/G
2 GEL TOPICAL DAILY
Qty: 450 EACH | Refills: 2 | Status: SHIPPED | OUTPATIENT
Start: 2024-10-28

## 2024-10-28 RX ORDER — TRAZODONE HYDROCHLORIDE 50 MG/1
50 TABLET ORAL NIGHTLY PRN
Qty: 30 TABLET | Refills: 5 | Status: SHIPPED | OUTPATIENT
Start: 2024-10-28 | End: 2025-04-26

## 2024-10-30 ENCOUNTER — PATIENT MESSAGE (OUTPATIENT)
Dept: FAMILY MEDICINE | Facility: CLINIC | Age: 89
End: 2024-10-30
Payer: MEDICARE

## 2024-10-30 ENCOUNTER — TELEPHONE (OUTPATIENT)
Dept: ENDOCRINOLOGY | Facility: CLINIC | Age: 89
End: 2024-10-30
Payer: MEDICARE

## 2024-11-14 ENCOUNTER — TELEPHONE (OUTPATIENT)
Dept: FAMILY MEDICINE | Facility: CLINIC | Age: 89
End: 2024-11-14
Payer: MEDICARE

## 2024-11-14 DIAGNOSIS — G47.09 OTHER INSOMNIA: ICD-10-CM

## 2024-11-14 DIAGNOSIS — M25.551 BILATERAL HIP PAIN: ICD-10-CM

## 2024-11-14 DIAGNOSIS — M25.552 BILATERAL HIP PAIN: ICD-10-CM

## 2024-11-14 DIAGNOSIS — R45.1 AGITATION: ICD-10-CM

## 2024-11-14 NOTE — TELEPHONE ENCOUNTER
----- Message from Tomasa sent at 11/14/2024  4:00 PM CST -----  Type: Needs Medical Advice  Who Called:  [facility]     Best Call Back Number:     [facility] - [place], [place] - [address]  [address]  [facility] [address]  Phone: [phone] Fax: [phone]      Additional Information: following up on fax for pts traZODone (DESYREL) 50 MG tablet and diclofenac sodium (VOLTAREN) 1 % Gel please advise

## 2024-11-15 RX ORDER — DICLOFENAC SODIUM 10 MG/G
2 GEL TOPICAL DAILY
Qty: 450 EACH | Refills: 2 | Status: SHIPPED | OUTPATIENT
Start: 2024-11-15

## 2024-11-15 RX ORDER — TRAZODONE HYDROCHLORIDE 50 MG/1
50 TABLET ORAL NIGHTLY PRN
Qty: 90 TABLET | Refills: 3 | Status: SHIPPED | OUTPATIENT
Start: 2024-11-15 | End: 2025-11-15

## 2024-11-25 ENCOUNTER — EXTERNAL HOME HEALTH (OUTPATIENT)
Dept: HOME HEALTH SERVICES | Facility: HOSPITAL | Age: 89
End: 2024-11-25
Payer: MEDICARE

## 2024-12-30 ENCOUNTER — LAB VISIT (OUTPATIENT)
Dept: LAB | Facility: HOSPITAL | Age: 89
End: 2024-12-30
Attending: INTERNAL MEDICINE
Payer: MEDICARE

## 2024-12-30 ENCOUNTER — OFFICE VISIT (OUTPATIENT)
Dept: ENDOCRINOLOGY | Facility: CLINIC | Age: 89
End: 2024-12-30
Payer: MEDICARE

## 2024-12-30 VITALS — DIASTOLIC BLOOD PRESSURE: 60 MMHG | OXYGEN SATURATION: 97 % | SYSTOLIC BLOOD PRESSURE: 120 MMHG | HEART RATE: 64 BPM

## 2024-12-30 DIAGNOSIS — Z78.0 POSTMENOPAUSAL: ICD-10-CM

## 2024-12-30 DIAGNOSIS — R74.8 ELEVATED ALKALINE PHOSPHATASE LEVEL: ICD-10-CM

## 2024-12-30 DIAGNOSIS — E55.9 VITAMIN D DEFICIENCY: ICD-10-CM

## 2024-12-30 DIAGNOSIS — M88.9 PAGET'S DISEASE OF BONE: Primary | ICD-10-CM

## 2024-12-30 DIAGNOSIS — M88.9 PAGET DISEASE OF BONE: ICD-10-CM

## 2024-12-30 PROBLEM — G89.29 CHRONIC LOW BACK PAIN: Status: ACTIVE | Noted: 2024-10-16

## 2024-12-30 LAB
25(OH)D3+25(OH)D2 SERPL-MCNC: 61 NG/ML (ref 30–96)
ALBUMIN SERPL BCP-MCNC: 3.4 G/DL (ref 3.5–5.2)
ALP SERPL-CCNC: 90 U/L (ref 40–150)
ALT SERPL W/O P-5'-P-CCNC: 10 U/L (ref 10–44)
ANION GAP SERPL CALC-SCNC: 8 MMOL/L (ref 8–16)
AST SERPL-CCNC: 16 U/L (ref 10–40)
BILIRUB SERPL-MCNC: 0.5 MG/DL (ref 0.1–1)
BUN SERPL-MCNC: 11 MG/DL (ref 8–23)
CALCIUM SERPL-MCNC: 9.4 MG/DL (ref 8.7–10.5)
CHLORIDE SERPL-SCNC: 105 MMOL/L (ref 95–110)
CO2 SERPL-SCNC: 30 MMOL/L (ref 23–29)
CREAT SERPL-MCNC: 0.7 MG/DL (ref 0.5–1.4)
EST. GFR  (NO RACE VARIABLE): >60 ML/MIN/1.73 M^2
GGT SERPL-CCNC: 16 U/L (ref 8–55)
GLUCOSE SERPL-MCNC: 76 MG/DL (ref 70–110)
POTASSIUM SERPL-SCNC: 3.9 MMOL/L (ref 3.5–5.1)
PROT SERPL-MCNC: 6.4 G/DL (ref 6–8.4)
SODIUM SERPL-SCNC: 143 MMOL/L (ref 136–145)

## 2024-12-30 PROCEDURE — 82306 VITAMIN D 25 HYDROXY: CPT | Performed by: INTERNAL MEDICINE

## 2024-12-30 PROCEDURE — G2211 COMPLEX E/M VISIT ADD ON: HCPCS | Mod: S$GLB,,, | Performed by: INTERNAL MEDICINE

## 2024-12-30 PROCEDURE — 1125F AMNT PAIN NOTED PAIN PRSNT: CPT | Mod: CPTII,S$GLB,, | Performed by: INTERNAL MEDICINE

## 2024-12-30 PROCEDURE — 3288F FALL RISK ASSESSMENT DOCD: CPT | Mod: CPTII,S$GLB,, | Performed by: INTERNAL MEDICINE

## 2024-12-30 PROCEDURE — 84075 ASSAY ALKALINE PHOSPHATASE: CPT | Performed by: INTERNAL MEDICINE

## 2024-12-30 PROCEDURE — 99999 PR PBB SHADOW E&M-EST. PATIENT-LVL IV: CPT | Mod: PBBFAC,,, | Performed by: INTERNAL MEDICINE

## 2024-12-30 PROCEDURE — 80053 COMPREHEN METABOLIC PANEL: CPT | Performed by: INTERNAL MEDICINE

## 2024-12-30 PROCEDURE — 1160F RVW MEDS BY RX/DR IN RCRD: CPT | Mod: CPTII,S$GLB,, | Performed by: INTERNAL MEDICINE

## 2024-12-30 PROCEDURE — 99204 OFFICE O/P NEW MOD 45 MIN: CPT | Mod: S$GLB,,, | Performed by: INTERNAL MEDICINE

## 2024-12-30 PROCEDURE — 82977 ASSAY OF GGT: CPT | Performed by: INTERNAL MEDICINE

## 2024-12-30 PROCEDURE — 1159F MED LIST DOCD IN RCRD: CPT | Mod: CPTII,S$GLB,, | Performed by: INTERNAL MEDICINE

## 2024-12-30 PROCEDURE — 1101F PT FALLS ASSESS-DOCD LE1/YR: CPT | Mod: CPTII,S$GLB,, | Performed by: INTERNAL MEDICINE

## 2024-12-30 NOTE — ASSESSMENT & PLAN NOTE
Discussed that the chronic pain may improve if it is related to Paget's.  However, there are other etiologies of back pain that would be unlikely to improve with Reclast, such as arthritis.

## 2024-12-30 NOTE — PROGRESS NOTES
"NEW PATIENT VISIT    Subjective:      Chief Complaint: Paget's disease    HPI: Anahy Stearns is a 89 y.o. female who is here for Paget's disease      Past Medical History:   Diagnosis Date    Dementia     Hyperlipidemia     Hypertension          With regards to Paget's disease:     Patient reports long term issues with chronic low back pain and hip pain mostly on the right side.  Alkaline phosphatase was recently noted to be elevated in October.  She had a CT scan done around that time as well which showed changes in the left hip concerning for Paget's disease.  These were essentially unchanged since the prior scan done in 2008.  Records indicate the alkaline phosphatase elevation appears to be new although the earliest labs we have available go back to 2015.    Takes multivitamin.     No history of any types of malignancy in the past.    Fracture history:  None       Dental work planned? No - Has top and bottom dentures    Lab Results   Component Value Date    [id number] 61 12/30/2024    CALCIUM 9.4 12/30/2024    CALCIUM 9.2 10/18/2024    CALCIUM 9.1 10/17/2024    ALKPHOS 90 12/30/2024    ALKPHOS 162 (H) 10/18/2024    ALKPHOS 186 (H) 10/17/2024    TSH 0.471 07/26/2024         No results found for: "CTELOPEPTIDE", "PROCOLLAGEN"    No Prior DXAs      Objective:     Vitals:    12/30/24 0902   BP: 120/60   Pulse: 64         BP Readings from Last 5 Encounters:   12/30/24 120/60   10/28/24 (!) 100/58   10/18/24 103/85   07/25/24 138/62   02/08/24 (!) 156/68         Physical Exam  Vitals and nursing note reviewed.   Constitutional:       General: She is not in acute distress.     Appearance: She is well-developed. She is not ill-appearing.   HENT:      Head: Normocephalic and atraumatic.   Neck:      Thyroid: No thyromegaly.      Trachea: No tracheal deviation.   Pulmonary:      Effort: Pulmonary effort is normal. No respiratory distress.   Neurological:      Mental Status: She is alert and oriented to person, place, " and time.      Coordination: Coordination normal.      Comments: Sitting upright in wheelchair.  Interactive.   Psychiatric:         Mood and Affect: Mood normal.         Behavior: Behavior normal.           Wt Readings from Last 3 Encounters:   10/28/24 1349 54.4 kg (119 lb 14.9 oz)   10/16/24 1057 54.4 kg (120 lb)   07/25/24 0930 54.5 kg (120 lb 4.2 oz)       Assessment/Plan:     Paget's disease of bone  Radiographic findings along with elevated alkaline phosphatase are suggestive of Paget's disease.  We will check repeat alkaline phosphatase, gamma GT and bone specific alkaline phosphatase to confirm biochemical evidence of active Paget's and to rule out other causes of the alkaline phosphatase elevation such as liver disease.      If labs are still consistent with active Paget's will plan to treat with Reclast x1.  We discussed the potential side effects of Reclast.  They would want to have her infusion done in [employer] so we will need to arrange for that.    Check baseline bone density prior to treatment.    Chronic low back pain  Discussed that the chronic pain may improve if it is related to Paget's.  However, there are other etiologies of back pain that would be unlikely to improve with Reclast, such as arthritis.    Elevated alkaline phosphatase level  See above     Postmenopausal  Check DXA

## 2024-12-30 NOTE — ASSESSMENT & PLAN NOTE
Radiographic findings along with elevated alkaline phosphatase are suggestive of Paget's disease.  We will check repeat alkaline phosphatase, gamma GT and bone specific alkaline phosphatase to confirm biochemical evidence of active Paget's and to rule out other causes of the alkaline phosphatase elevation such as liver disease.      If labs are still consistent with active Paget's will plan to treat with Reclast x1.  We discussed the potential side effects of Reclast.  They would want to have her infusion done in [place] so we will need to arrange for that.    Check baseline bone density prior to treatment.

## 2025-01-02 LAB — ALP BONE SERPL-MCNC: 19.3 UG/L

## 2025-01-08 ENCOUNTER — HOSPITAL ENCOUNTER (OUTPATIENT)
Dept: RADIOLOGY | Facility: HOSPITAL | Age: OVER 89
Discharge: HOME | End: 2025-01-08
Attending: INTERNAL MEDICINE
Payer: MEDICARE

## 2025-01-08 DIAGNOSIS — Z78.0 POSTMENOPAUSAL: ICD-10-CM

## 2025-01-08 PROCEDURE — 77091 TBS TECHL CALCULATION ONLY: CPT | Mod: HCNC,PO

## 2025-01-08 PROCEDURE — 77092 TBS I&R FX RSK QHP: CPT | Mod: HCNC,,, | Performed by: RADIOLOGY

## 2025-01-08 PROCEDURE — 77080 DXA BONE DENSITY AXIAL: CPT | Mod: 26,HCNC,, | Performed by: RADIOLOGY

## 2025-01-14 ENCOUNTER — PATIENT MESSAGE (OUTPATIENT)
Dept: ENDOCRINOLOGY | Facility: CLINIC | Age: OVER 89
End: 2025-01-14
Payer: MEDICARE

## 2025-01-14 DIAGNOSIS — Z00.00 ENCOUNTER FOR MEDICARE ANNUAL WELLNESS EXAM: ICD-10-CM

## 2025-01-24 ENCOUNTER — TELEPHONE (OUTPATIENT)
Dept: ENDOCRINOLOGY | Facility: CLINIC | Age: OVER 89
End: 2025-01-24
Payer: MEDICARE

## 2025-01-28 ENCOUNTER — PATIENT MESSAGE (OUTPATIENT)
Dept: ENDOCRINOLOGY | Facility: CLINIC | Age: OVER 89
End: 2025-01-28
Payer: MEDICARE

## 2025-01-28 DIAGNOSIS — M81.0 POSTMENOPAUSAL OSTEOPOROSIS: Primary | ICD-10-CM

## 2025-01-28 RX ORDER — HEPARIN 100 UNIT/ML
500 SYRINGE INTRAVENOUS
OUTPATIENT
Start: 2025-01-28

## 2025-01-28 RX ORDER — ZOLEDRONIC ACID 5 MG/100ML
5 INJECTION, SOLUTION INTRAVENOUS
OUTPATIENT
Start: 2025-01-28

## 2025-01-28 RX ORDER — SODIUM CHLORIDE 0.9 % (FLUSH) 0.9 %
10 SYRINGE (ML) INJECTION
OUTPATIENT
Start: 2025-01-28

## 2025-01-31 ENCOUNTER — TELEPHONE (OUTPATIENT)
Dept: ADMINISTRATIVE | Facility: CLINIC | Age: OVER 89
End: 2025-01-31
Payer: MEDICARE

## 2025-02-07 ENCOUNTER — PATIENT MESSAGE (OUTPATIENT)
Dept: INFUSION THERAPY | Facility: HOSPITAL | Age: OVER 89
End: 2025-02-07
Payer: MEDICARE

## 2025-02-13 ENCOUNTER — OFFICE VISIT (OUTPATIENT)
Dept: FAMILY MEDICINE | Facility: CLINIC | Age: OVER 89
End: 2025-02-13
Payer: MEDICARE

## 2025-02-13 VITALS
DIASTOLIC BLOOD PRESSURE: 58 MMHG | WEIGHT: 115.94 LBS | SYSTOLIC BLOOD PRESSURE: 102 MMHG | OXYGEN SATURATION: 98 % | BODY MASS INDEX: 19.3 KG/M2 | HEART RATE: 65 BPM

## 2025-02-13 DIAGNOSIS — F03.B4 MODERATE DEMENTIA WITH ANXIETY, UNSPECIFIED DEMENTIA TYPE: Primary | ICD-10-CM

## 2025-02-13 DIAGNOSIS — L60.9 NAIL PROBLEM: ICD-10-CM

## 2025-02-13 DIAGNOSIS — M81.8 OTHER OSTEOPOROSIS WITHOUT CURRENT PATHOLOGICAL FRACTURE: ICD-10-CM

## 2025-02-13 DIAGNOSIS — I10 ESSENTIAL HYPERTENSION: ICD-10-CM

## 2025-02-13 DIAGNOSIS — M54.2 NECK PAIN, MUSCULOSKELETAL: ICD-10-CM

## 2025-02-13 DIAGNOSIS — M88.9 PAGET DISEASE OF BONE: ICD-10-CM

## 2025-02-13 DIAGNOSIS — E78.49 OTHER HYPERLIPIDEMIA: ICD-10-CM

## 2025-02-13 DIAGNOSIS — R60.0 LOCALIZED EDEMA: ICD-10-CM

## 2025-02-17 ENCOUNTER — OFFICE VISIT (OUTPATIENT)
Dept: NEUROLOGY | Facility: CLINIC | Age: OVER 89
End: 2025-02-17
Payer: MEDICARE

## 2025-02-17 VITALS
HEIGHT: 65 IN | HEART RATE: 79 BPM | DIASTOLIC BLOOD PRESSURE: 69 MMHG | BODY MASS INDEX: 19.6 KG/M2 | SYSTOLIC BLOOD PRESSURE: 132 MMHG | WEIGHT: 117.63 LBS

## 2025-02-17 DIAGNOSIS — F02.818 MAJOR NEUROCOGNITIVE DISORDER DUE TO ALZHEIMER DISEASE, WITH BEHAVIORAL DISTURBANCE: Primary | ICD-10-CM

## 2025-02-17 DIAGNOSIS — G30.9 MAJOR NEUROCOGNITIVE DISORDER DUE TO ALZHEIMER DISEASE, WITH BEHAVIORAL DISTURBANCE: Primary | ICD-10-CM

## 2025-02-17 NOTE — PROGRESS NOTES
NEUROLOGY  Outpatient Consultation Visit   [facility]  [address] [facility] [address], [place] [address]  [phone] (office) / [phone] (fax)    Patient Name:  Anahy Stearns  :  3/15/1934  MR #:  [id number]  Acct #:  [id number]    Date of Neurology Consult: 2025  Name of Provider: ARACELI Ngo    Other Physicians:  Ashley Reid MD (Primary Care Physician); Ashley Reid MD (Referring)      Chief Complaint: Memory Loss      History of Present Illness (HPI):  Anahy Stearns is a right handed  89 y.o. female here to establish care for alzheimer's dementia. Patient is here with daughter, Jada, who also contributes to the following history.     Patient's highest level of education was a bachelor's degree in accounting. She worked as an  for [employer].     Daughter and patient note forgetfulness particularly numbers and names. They first noticed this in  with the most significant progression over the last 3 years.     Daughter notes that she will get agitated. Patient denies feeling depressed. She does have hallucinations about people going through her purse and closet. Daughter does not feel that they are bothersome to her. Granddaughter is able to redirect her.      She is taking trazadone nightly for sleep. She will wander at night. Unclear how much she is sleeping at night. Daughter notes that she will sleep during the day. Daughter notes that she will get her days and nights confused.     She is still living in her house. Her grandaughter is living with her. She does spend some time during the day alone. Discussed that we recommend 24/7 supervision with family, sitters or possibly nursing home placement with memory care.    Denies any recent falls. She uses a walker.     Daughter and grand daughter are manging medications. Her granddaughter is managing her finances.     She sometimes requires assistance with dressing. She needs reminders to take a bath.      She is not cooking or using the kitchen.     She is not driving.     She spends a lot of time watching TV. She will sometimes wander out of the house trying to walk to her daughter's house a block away.       NP Testing 10/11/2024:     Ms. Stearns is a 89 y.o. female with history of hypertension, hyperlipidemia, headaches. She is referred for a neuropsychological evaluation in the context of progressive memory decline with onset predating . Her family reports rapid forgetting, difficulty recognizing family members, day/night confusion, hallucinations (sees her  sisters and various children), delusions (believes people are stealing from her). She requires assistance for basic and instrumental ADLs. Brain MRI shows generalized volume loss. She is prescribed Aricept.      Premorbid abilities are estimated to be in the average range. She displays diffuse cognitive impairments in domains of memory (with minimal benefit from cues or recognition format), naming and semantic fluency, visuoconstruction, and executive functioning. Temporal orientation is impaired. Attention, simple processing speed, and letter fluency are within normal limits. She denies elevated mood symptoms on questionnaires, although her family notices frequent dysphoria and agitation.      Her clinical presentation and cognitive profile are unfortunately suggestive of Alzheimer's disease. She meets criteria for major neurocognitive disorder, moderate severity, with behavioral disturbance (agitation, hallucinations). She requires 24-hour supervision. There are safety concerns (leaving the house unattended, poor judgment and falls) that indicate a higher level of care is required and assistance is needed for her caregivers. It is possible that high risk medications are exacerbating deficits (oxybutynin and gabapentin), which warrants medication review.       Treatment to date:  Aricept 5 mg nightly       Past Medical, Surgical, Family &  "Social History:   Past Medical History:   Diagnosis Date    Dementia     Hyperlipidemia     Hypertension      Past Surgical History:   Procedure Laterality Date    BACK SURGERY      BUNIONECTOMY       SECTION      CHOLECYSTECTOMY      HYSTERECTOMY      TONSILLECTOMY       Family History   Problem Relation Name Age of Onset    Heart disease Sister      Diabetes Sister      Kidney failure Sister       Alcohol use:  reports no history of alcohol use.   (Of note, 0.6 oz = 1 beer or 6 oz = 10 beers).  Tobacco use:  reports that she has never smoked. She has never used smokeless tobacco.  Street drug use:  reports no history of drug use.  Allergies: Patient has no known allergies..    Home Medications:   Current Medications[1]    Physical Examination:  /69 (BP Location: Left arm, Patient Position: Sitting)   Pulse 79   Ht 5' 5" (1.651 m)   Wt 53.4 kg (117 lb 9.9 oz)   BMI 19.57 kg/m²     Neurological exam:  Mental status: Awake and alert.  Oriented to person, place, time and situation. Recent and remote memory appear to be intact.  Fund of knowledge normal. Normal attention and concentration.   Speech/Language: Fluent and appropriate. No dysarthria or aphasia on conversation. Able to follow complex commands.   Cranial nerves (II-XII): Visual fields full. Pupils equal round and reactive to light, extraocular movements intact, no ptosis, no nystagmus. Facial sensation and symmetry intact bilaterally. Hearing grossly intact. Palate mobile and midline. Shoulder shrug normal bilaterally. Normal tongue protrusion.   Motor: 5 out of 5 strength throughout the upper and lower extremities bilaterally. Normal bulk and tone. No abnormal movements noted. No drift appreciated.   Sensation: Intact to light touch, pinprick, vibration and temperature bilaterally. Normal proprioception.   DTR: 2+ at the knees and biceps bilaterally.  Coordination: Finger-nose-finger testing intact bilaterally. TABATHA normal bilaterally. No " "tremor. Romberg absent.   Gait: Normal gait, including heel, toe and tandem.      Diagnostic Data Reviewed:   I have personally reviewed provider notes, labs and imaging made available to me today.     Imaging:  MRI Brain 8/22/24:   Personally reviewed. Mild white matter disease. Generalized atrophy.     Labs:  Lab Results   Component Value Date    WBC 5.05 10/18/2024    HGB 11.0 (L) 10/18/2024    HCT 33.5 (L) 10/18/2024     10/18/2024    MCV 90 10/18/2024    RDW 13.9 10/18/2024     Lab Results   Component Value Date     12/30/2024    K 3.9 12/30/2024     12/30/2024    CO2 30 (H) 12/30/2024    BUN 11 12/30/2024    CREATININE 0.7 12/30/2024    GLU 76 12/30/2024    CALCIUM 9.4 12/30/2024    MG 2.0 10/16/2024     Lab Results   Component Value Date    PROT 6.4 12/30/2024    ALBUMIN 3.4 (L) 12/30/2024    BILITOT 0.5 12/30/2024    AST 16 12/30/2024    ALKPHOS 90 12/30/2024    ALT 10 12/30/2024    GGT 16 12/30/2024     No results found for: "INR", "PROTIME", "PTT"  Lab Results   Component Value Date    CHOL 263 (H) 07/26/2024    HDL 80 (H) 07/26/2024    LDLCALC 160.8 (H) 07/26/2024    TRIG 111 07/26/2024    CHOLHDL 30.4 07/26/2024     No results found for: "LABA1C", "HGBA1C"   Lab Results   Component Value Date    [id number] 604 07/26/2024     Lab Results   Component Value Date    FOLATE 13.3 07/26/2024     Lab Results   Component Value Date    TSH 0.471 07/26/2024     No results found for: "VITAMINB1"  No results found for: "RPR"  No results found for: "AKI"  No components found for: "HEPATITISCANTIBODY"  No components found for: "HIV 1/2 AG/AB"  No results found for: "CRP"  No components found for: "SEDIMENTATIONRATE"      Assessment and Plan:  Anahy Stearns is a 89 y.o. female here to establish care for Alzheimer's dementia.       Problem List Items Addressed This Visit       Major neurocognitive disorder due to Alzheimer disease, with behavioral disturbance - Primary    Overview   NP Testing " 10/11/2024:     Alzheimer's disease. She meets criteria for major neurocognitive disorder, moderate severity, with behavioral disturbance          Current Assessment & Plan   -89 y/o female with with rapid forgetting, difficulty recognizing family members, confusion with days and nights and hallucinations. Additionally, she requires assistance with bathing, feeding, and dressing.    -Onset ~2021.   -Granddaughter is living with her. Daughter notes that she is spending some time during the day alone. Reinforced that patient requires 24/7 supervision for safety. Some safety concern with patient wandering. Encouraged the use of locks and safety measures in the house.  -Patient having some hallucinations about people in her purse and closet. Daughter notes that she is easily redirectable and patient notes that she is not bothered by this. Will continue to clinically monitor.   -Medication list reviewed. Recommend discussing alternative to oxybutynin with PCP. Would prefer Myrbetriq from neurological standpoint. Discussed risk vs benefit of gabapentin. Daughter notes significant improvement in nerve pain and does not want stop this.             Important to note, also  has a past medical history of Dementia, Hyperlipidemia, and Hypertension.      The patient will return to clinic in 6 months for memory care.     Thank you very much for the opportunity to assist in this patient's care.    If you have any questions or concerns, please do not hesitate to contact me at any time.    Sincerely,       ARACELI Ngo  [facility] [place]     Time Spent: I spent a total of 51 minutes on the day of the visit.This includes face to face time and non-face to face time preparing to see the patient (eg, review of tests), Obtaining and/or reviewing separately obtained history, Documenting clinical information in the electronic or other health record, Independently interpreting resultsand communicating  results to the patient/family/caregiver, or Care coordination.           [1]   Current Outpatient Medications:     acetaminophen (TYLENOL) 325 MG tablet, Take 325 mg by mouth every 6 (six) hours as needed (pain/headache)., Disp: , Rfl:     amlodipine (NORVASC) 5 MG tablet, Take 2.5 mg by mouth once daily., Disp: , Rfl: 1    aspirin 81 MG Chew, Take 81 mg by mouth once daily., Disp: , Rfl:     diclofenac sodium (VOLTAREN) 1 % Gel, Apply 2 g topically once daily., Disp: 450 each, Rfl: 2    donepeziL (ARICEPT) 5 MG tablet, Take 5 mg by mouth every evening., Disp: , Rfl:     gabapentin (NEURONTIN) 300 MG capsule, Take 300 mg by mouth every evening., Disp: , Rfl: 6    LIDOcaine (LIDODERM) 5 %, Place 1-2 patches onto the skin daily as needed (pain)., Disp: , Rfl:     linaCLOtide (LINZESS) 72 mcg Cap capsule, Take 1 capsule (72 mcg total) by mouth before breakfast., Disp: 30 capsule, Rfl: 2    omeprazole (PRILOSEC) 20 MG capsule, Take 20 mg by mouth once daily., Disp: , Rfl: 3    oxybutynin (DITROPAN XL) 15 MG TR24, Take 15 mg by mouth once daily., Disp: , Rfl:     ramipril (ALTACE) 10 MG capsule, Take 10 mg by mouth once daily., Disp: , Rfl: 1    traZODone (DESYREL) 50 MG tablet, Take 1 tablet (50 mg total) by mouth nightly as needed for Insomnia., Disp: 90 tablet, Rfl: 3  No current facility-administered medications for this visit.

## 2025-02-17 NOTE — PROGRESS NOTES
Assessment:       1. Moderate dementia with anxiety, unspecified dementia type    2. Other hyperlipidemia    3. Essential hypertension    4. Paget disease of bone    5. Localized edema    6. Other osteoporosis without current pathological fracture    7. Neck pain, musculoskeletal        Assessment & Plan    Moderate dementia with anxiety: Stable    Other hyperlipidemia:  Uncontrolled    Essential hypertension: On the lower side  -     Comprehensive Metabolic Panel; Future; Expected date: 02/13/2025  -     Lipid Panel; Future; Expected date: 02/13/2025  -     Microalbumin/Creatinine Ratio, Urine; Future; Expected date: 02/13/2025    Paget disease of bone: Stable  -     CBC Auto Differential; Future; Expected date: 02/13/2025  -     Vitamin D; Future; Expected date: 02/13/2025    Localized edema: Stable    Other osteoporosis without current pathological fracture: Stable  -     Vitamin D; Future; Expected date: 02/13/2025    Neck pain, musculoskeletal: Worsening  -     X-Ray Cervical Spine 2 or 3 Views; Future; Expected date: 02/13/2025    Visit today included increased complexity associated with the care of the episodic problem dementia, hypertension and hyperlipidemia addressed and managing the longitudinal care of the patient due to the serious and/or complex managed problem(s) dementia, hypertension, hyperlipidemia.      Decreased amlodipine dosage from 5mg to 2.5mg due to consistently low blood pressure readings (102/58, 103/100, 102/50) to prevent dizziness and fall risk  Opted not to prescribe cholesterol medication due to potential side effects outweighing benefits  Deferred immediate imaging for neck pain  Attributed neck discomfort to possible arthritis, sleeping position, or Paget's disease-related bone pain  Noted improvement in albumin and protein levels, indicating better protein intake  Acknowledged stable hemoglobin levels  Recognized previously diagnosed diabetes, now resolved  Reviewed endocrinologist  recommendation for Reclast infusion to address osteoporosis and Paget's disease  The patient daughter will message endocrinologist to confirm desire to proceed with Reclast infusion    TYPE 2 DIABETES MELLITUS WITHOUT COMPLICATIONS:  - Patient's blood glucose level was 76, within normal range.  - Assessed that the patient no longer has diabetes and discontinued diabetes treatment.  - Decreased amlodipine from 5mg to 2.5mg daily and continued baby aspirin, to be taken with food.  - Emphasized the importance of maintaining normal blood pressure, avoiding excessively low readings to prevent dizziness and fall risk.  - Discussed the relationship between aging and medication metabolism, noting prolonged drug effects due to decreased kidney and liver function.  - Clarified that cholesterol medication is not typically recommended for patients over 80 due to potential side effects outweighing benefits.    MODERATE DEMENTIA WITH ANXIETY, UNSPECIFIED DEMENTIA TYPE:  - Monitored the patient's memory, noting difficulty remembering medication intake but reported to be doing relatively well with some minor issues.  - Patient has an upcoming appointment with neurologist  for memory evaluation.  - Continued Aricept for memory management.  - Reiterated decision against prescribing cholesterol medication due to potential side effects on memory in patients over 80.  - Instructed the patient to use a pill organizer or reminder system to help with medication adherence.    NECK DISCOMFORT:  - Advised patient on maintaining good posture to alleviate neck discomfort, noting potential connections to arthritis or Paget's disease.  - Continue using Voltaren gel as needed.  - Neck x-rays ordered, to be scheduled at patient's discretion.    OTHER INSTRUCTIONS:  - Increase water intake.            Patient agreed with assessment and plan. Patient verbalized understanding.     Subjective:       Patient ID: Anahy Stearns is a 89 y.o.  female.    Chief Complaint: Follow-up (Pt experiencing headaches. Pt requesting Podiatry referral for toe nail cutting)      History of Present Illness    CHIEF COMPLAINT:  Patient presents for a follow-up visit to discuss blood pressure management and other ongoing health concerns.    HPI:  Patient, a 89-year-old female, reports occasional headaches and a constant warm sensation in her neck, particularly in the back, which she describes as feeling hot all the time without associated pain. She initially thought this sensation might be related to having a fever, but her temperature was normal at 97.5°F during the visit.    Her blood pressure has been consistently low during recent visits, with today's readings at 102/58 mmHg and 102/50 mmHg. She denies feeling dizzy despite the low blood pressure. She reports drinking only water, consuming about 2 bottles per day.    She has a history of Paget's disease and osteoporosis, for which she is being followed by Dr. Velasquez Bejarano, an endocrinologist at [facility]. A recent bone density scan confirmed osteoporosis, and the doctor has recommended moving forward with Reclast medication via IV infusion to address both conditions.    She is scheduled to see a neurologist on the 17th and will be seeing Elizabeth cK for memory-related concerns. She mentions difficulty remembering if she has taken her medications.    Previously diagnosed with diabetes, she reports not having it anymore. Her recent blood glucose level was 76, which was described as great.    She denies numbness or tingling sensations, and leg cramps. She denies currently having diabetes.    MEDICATIONS:  Patient is on Amlodipine for blood pressure, initially at 5 mg daily but decreased to 2.5 mg daily due to low blood pressure readings. She takes baby aspirin daily with food and uses Voltaren gel for topical application. Patient is also on Aricept for memory.    MEDICAL HISTORY:  Patient has a history  of hypertension, Paget's disease, and osteoporosis. She was previously diagnosed with diabetes, which is no longer present. Patient received a flu vaccine recently.    TEST RESULTS:  Recent lab results show normal alkaline phosphatase levels and improved, almost normal protein levels for albumin. Patient's glucose was 76, cholesterol levels were high, and hemoglobin was stable. A recent bone density scan revealed osteoporosis.      ROS:  ROS as indicated in HPI.          Past medical history, past social history was reviewed and discussed with the patient.        Objective:      Physical Exam      General: No acute distress. Well-developed. Well-nourished.  Eyes: EOMI. Sclerae anicteric.  HENT: Normocephalic. Atraumatic. Nares patent. Moist oral mucosa.  Cardiovascular: Regular rate. Regular rhythm.   Respiratory: Normal respiratory effort. Clear to auscultation bilaterally. No rales. No rhonchi. No wheezing.  Musculoskeletal: No  obvious deformity.  Extremities: No lower extremity edema. Swelling in legs.  Skin: Warm. Dry. No rash.                   This note was generated with the assistance of ambient listening technology. Verbal consent was obtained by the patient and accompanying visitor(s) for the recording of patient appointment to facilitate this note. I attest to having reviewed and edited the generated note for accuracy, though some syntax or spelling errors may persist. Please contact the author of this note for any clarification.

## 2025-02-17 NOTE — PROGRESS NOTES
Caregiver name: Jada Christiansen  Relationship to the patient: daughter  Does the patient have a living will? No  Does the patient have a designated healthcare POA? Yes  Does the patient have a designated general POA? Yes    Have educational materials/resources been provided? Yes      Activities of Daily Living    Bathing: Independent  Dressing: Needs Help  Grooming: Independent  Mouth Care: Independent  Toileting: Independent  Transferring Bed/Chair: Needs Help  Walking: Needs Help  Climbing: Needs Help  Eating: Independent      Instrumental Activities of Daily Living    Shopping: Dependent  Cooking: Dependent  Managing Medications: Dependent  Using the phone and looking up numbers: Cannot Do  Doing Housework: Cannot Do  Doing Laundry: Dependent  Driving or using public transportation: Cannot Do  Managing finances: Cannot Do    Functional Assessment Staging  5   Requires assistance in choosing proper clothing to wear for the day, season, or occasion, e.g. patient may wear the same clothing repeatedly, unless supervised.*             2/17/2025     2:19 PM 2/13/2025    10:46 AM 7/25/2024     9:31 AM   Depression Patient Health Questionnaire   Over the last two weeks how often have you been bothered by little interest or pleasure in doing things Not at all Not at all Not at all   Over the last two weeks how often have you been bothered by feeling down, depressed or hopeless Not at all Not at all Not at all   PHQ-2 Total Score 0 0 0

## 2025-02-18 ENCOUNTER — TELEPHONE (OUTPATIENT)
Dept: ENDOCRINOLOGY | Facility: CLINIC | Age: OVER 89
End: 2025-02-18
Payer: MEDICARE

## 2025-02-18 ENCOUNTER — TELEPHONE (OUTPATIENT)
Dept: INFUSION THERAPY | Facility: HOSPITAL | Age: OVER 89
End: 2025-02-18
Payer: MEDICARE

## 2025-02-18 DIAGNOSIS — M88.9 PAGET'S DISEASE OF BONE: Primary | ICD-10-CM

## 2025-02-18 NOTE — TELEPHONE ENCOUNTER
----- Message from [place] sent at 2/17/2025  4:27 PM CST -----  Type: Needs Medical AdviceWho Called:  Madelyn (daughter)Symptoms (please be specific):  How long has patient had these symptoms:  Pharmacy name and phone #:  Best Call Back Number: [phone] Information: Madelyn is requesting a call back in regards to an appt. for her Mom in the morning.

## 2025-02-19 ENCOUNTER — INFUSION (OUTPATIENT)
Dept: INFUSION THERAPY | Facility: HOSPITAL | Age: OVER 89
End: 2025-02-19
Attending: INTERNAL MEDICINE
Payer: MEDICARE

## 2025-02-19 VITALS
WEIGHT: 117.94 LBS | HEIGHT: 65 IN | DIASTOLIC BLOOD PRESSURE: 65 MMHG | TEMPERATURE: 99 F | SYSTOLIC BLOOD PRESSURE: 127 MMHG | RESPIRATION RATE: 18 BRPM | OXYGEN SATURATION: 98 % | HEART RATE: 68 BPM | BODY MASS INDEX: 19.65 KG/M2

## 2025-02-19 DIAGNOSIS — M81.0 POSTMENOPAUSAL OSTEOPOROSIS: Primary | ICD-10-CM

## 2025-02-19 DIAGNOSIS — M88.9 PAGET'S DISEASE OF BONE: ICD-10-CM

## 2025-02-19 PROCEDURE — 63600175 PHARM REV CODE 636 W HCPCS: Mod: HCNC,PN | Performed by: INTERNAL MEDICINE

## 2025-02-19 PROCEDURE — 25000003 PHARM REV CODE 250: Mod: HCNC,PN | Performed by: INTERNAL MEDICINE

## 2025-02-19 PROCEDURE — 96374 THER/PROPH/DIAG INJ IV PUSH: CPT | Mod: HCNC,PN

## 2025-02-19 RX ORDER — ZOLEDRONIC ACID 5 MG/100ML
5 INJECTION, SOLUTION INTRAVENOUS
OUTPATIENT
Start: 2025-02-19

## 2025-02-19 RX ORDER — HEPARIN 100 UNIT/ML
500 SYRINGE INTRAVENOUS
OUTPATIENT
Start: 2025-02-19

## 2025-02-19 RX ORDER — SODIUM CHLORIDE 0.9 % (FLUSH) 0.9 %
10 SYRINGE (ML) INJECTION
OUTPATIENT
Start: 2025-02-19

## 2025-02-19 RX ORDER — SODIUM CHLORIDE 0.9 % (FLUSH) 0.9 %
10 SYRINGE (ML) INJECTION
Status: DISCONTINUED | OUTPATIENT
Start: 2025-02-19 | End: 2025-02-19 | Stop reason: HOSPADM

## 2025-02-19 RX ADMIN — ZOLEDRONIC ACID: 4 INJECTION, SOLUTION, CONCENTRATE INTRAVENOUS at 09:02

## 2025-02-19 RX ADMIN — SODIUM CHLORIDE: 9 INJECTION, SOLUTION INTRAVENOUS at 09:02

## 2025-02-19 NOTE — ASSESSMENT & PLAN NOTE
-89 y/o female with with rapid forgetting, difficulty recognizing family members, confusion with days and nights and hallucinations. Additionally, she requires assistance with bathing, feeding, and dressing.    -Onset ~2021.   -Granddaughter is living with her. Daughter notes that she is spending some time during the day alone. Reinforced that patient requires 24/7 supervision for safety. Some safety concern with patient wandering. Encouraged the use of locks and safety measures in the house.  -Patient having some hallucinations about people in her purse and closet. Daughter notes that she is easily redirectable and patient notes that she is not bothered by this. Will continue to clinically monitor.   -Medication list reviewed. Recommend discussing alternative to oxybutynin with PCP. Would prefer Myrbetriq from neurological standpoint. Discussed risk vs benefit of gabapentin. Daughter notes significant improvement in nerve pain and does not want stop this.

## 2025-02-19 NOTE — PLAN OF CARE
Problem: Adult Inpatient Plan of Care  Goal: Patient-Specific Goal (Individualized)  Outcome: Progressing  Flowsheets (Taken 2/19/2025 1116)  Individualized Care Needs: None  Anxieties, Fears or Concerns: None  Patient/Family-Specific Goals (Include Timeframe): No s/s of reaction from treatment     Problem: Fatigue  Goal: Improved Activity Tolerance  Outcome: Progressing  Intervention: Promote Improved Energy  Flowsheets (Taken 2/19/2025 1116)  Fatigue Management:   fatigue-related activity identified   activity assistance provided   paced activity encouraged   frequent rest breaks encouraged  Sleep/Rest Enhancement:   natural light exposure provided   relaxation techniques promoted  Activity Management:   Ambulated -L4   Up in chair - L3  Environmental Support:   environmental consistency promoted   personal routine supported   rooming-in facilitated   rest periods encouraged     Problem: Adult Inpatient Plan of Care  Goal: Plan of Care Review  Outcome: Progressing  Flowsheets (Taken 2/19/2025 1116)  Plan of Care Reviewed With: patient  Pt tolerated Zometa infusion well, NAD. Pt education reinforced on potential side effects, what to expect and when to call the physician. Pt given a schedule and reviewed, pt verbalized understanding. Pt was assisted out of the clinic without difficulty via walker accompanied by her daughter .

## 2025-02-21 ENCOUNTER — TELEPHONE (OUTPATIENT)
Dept: PODIATRY | Facility: CLINIC | Age: OVER 89
End: 2025-02-21
Payer: MEDICARE

## 2025-02-21 NOTE — TELEPHONE ENCOUNTER
Called patient to verify what appointment was for, no answer left voicemail with return number and letting patient know we do not trim nails in this clinic.

## 2025-02-25 ENCOUNTER — OFFICE VISIT (OUTPATIENT)
Dept: PODIATRY | Facility: CLINIC | Age: OVER 89
End: 2025-02-25
Payer: MEDICARE

## 2025-02-25 VITALS — WEIGHT: 117.94 LBS | RESPIRATION RATE: 14 BRPM | HEIGHT: 65 IN | BODY MASS INDEX: 19.65 KG/M2

## 2025-02-25 DIAGNOSIS — L60.9 NAIL PROBLEM: ICD-10-CM

## 2025-02-25 DIAGNOSIS — B35.1 ONYCHOMYCOSIS DUE TO DERMATOPHYTE: ICD-10-CM

## 2025-02-25 DIAGNOSIS — R60.0 BILATERAL LOWER EXTREMITY EDEMA: ICD-10-CM

## 2025-02-25 DIAGNOSIS — L60.2 ONYCHOGRYPHOSIS: Primary | ICD-10-CM

## 2025-02-25 PROCEDURE — 1101F PT FALLS ASSESS-DOCD LE1/YR: CPT | Mod: HCNC,CPTII,S$GLB, | Performed by: PODIATRIST

## 2025-02-25 PROCEDURE — 1159F MED LIST DOCD IN RCRD: CPT | Mod: HCNC,CPTII,S$GLB, | Performed by: PODIATRIST

## 2025-02-25 PROCEDURE — 99203 OFFICE O/P NEW LOW 30 MIN: CPT | Mod: HCNC,S$GLB,, | Performed by: PODIATRIST

## 2025-02-25 PROCEDURE — 1160F RVW MEDS BY RX/DR IN RCRD: CPT | Mod: HCNC,CPTII,S$GLB, | Performed by: PODIATRIST

## 2025-02-25 PROCEDURE — 99999 PR PBB SHADOW E&M-EST. PATIENT-LVL III: CPT | Mod: PBBFAC,HCNC,, | Performed by: PODIATRIST

## 2025-02-25 PROCEDURE — 3288F FALL RISK ASSESSMENT DOCD: CPT | Mod: HCNC,CPTII,S$GLB, | Performed by: PODIATRIST

## 2025-02-25 PROCEDURE — 1126F AMNT PAIN NOTED NONE PRSNT: CPT | Mod: HCNC,CPTII,S$GLB, | Performed by: PODIATRIST

## 2025-02-25 NOTE — PROGRESS NOTES
"  [address] [facility] [place] [place] [address]  Phone: [phone]   [phone]    Patient's PCP:Ashley Reid MD  Referring Provider: Dr. Ashley Reid    Subjective:      Chief Complaint:: Nail Problem (Elongated curving nails possible fungus)    BIA Stearns is a 89 y.o. female who presents today with a complaint of bilateral elongated curving nails. The current episode started years.  The symptoms include elongated curving nails difficulty trimming. Probable cause of complaint possible fungus.  The symptoms are aggravated by trimming and shoe gear. The problem has worsened. Treatment to date have included attempted home trimming which provided no relief.      Vitals:    25 1029   Resp: 14   Weight: 53.5 kg (117 lb 15.1 oz)   Height: 5' 5" (1.651 m)   PainSc: 0-No pain      Shoe Size: 7-7.5    Past Surgical History:   Procedure Laterality Date    BACK SURGERY      BUNIONECTOMY       SECTION      CHOLECYSTECTOMY      HYSTERECTOMY      TONSILLECTOMY       Past Medical History:   Diagnosis Date    Dementia     Hyperlipidemia     Hypertension      Family History   Problem Relation Name Age of Onset    Heart disease Sister      Diabetes Sister      Kidney failure Sister          Social History:   Marital Status:   Alcohol History:  reports no history of alcohol use.  Tobacco History:  reports that she has never smoked. She has never used smokeless tobacco.  Drug History:  reports no history of drug use.    Review of patient's allergies indicates:  No Known Allergies    Current Medications[1]    Review of Systems   Constitutional:  Negative for chills, fatigue, fever and unexpected weight change.   HENT:  Negative for hearing loss and trouble swallowing.    Eyes:  Negative for photophobia and visual disturbance.   Respiratory:  Negative for cough, shortness of breath and wheezing.    Cardiovascular:  Negative for chest pain, palpitations and leg swelling.   Gastrointestinal:  " Negative for abdominal pain and nausea.   Genitourinary:  Negative for dysuria and frequency.   Musculoskeletal:  Positive for gait problem. Negative for arthralgias, back pain, joint swelling and myalgias.   Skin:  Negative for rash.   Neurological:  Negative for tremors, seizures, speech difficulty, weakness and headaches.   Hematological:  Does not bruise/bleed easily.         Objective:        Physical Exam:   Foot Exam    General  Orientation: alert and oriented to person, place, and time   Affect: appropriate   Assistance: walker use       Right Foot/Ankle     Inspection and Palpation  Ecchymosis: none  Tenderness: none   Swelling: ankle (Mild lower extremity edema)  Skin Exam: dry skin;   Fungus Toenails: present (Nails 1 through 5 thickened and elongated)    Neurovascular  Dorsalis pedis: 1+  Posterior tibial: 1+  Capillary Refill: 3+      Left Foot/Ankle      Inspection and Palpation  Tenderness: none   Swelling: ankle (Mild lower extremity edema)  Skin Exam: dry skin;   Fungus Toenails: present (Nails 1 through 5 thickened and elongated)    Neurovascular  Dorsalis pedis: 1+  Posterior tibial: 1+  Capillary refill: 3+        Physical Exam  Cardiovascular:      Pulses:           Dorsalis pedis pulses are 1+ on the right side and 1+ on the left side.        Posterior tibial pulses are 1+ on the right side and 1+ on the left side.   Musculoskeletal:      Right ankle: Swelling present.      Left ankle: Swelling present.   Feet:      Right foot:      Skin integrity: Dry skin present.      Toenail Condition: Fungal disease (Nails 1 through 5 thickened and elongated) present.     Left foot:      Skin integrity: Dry skin present.      Toenail Condition: Fungal disease (Nails 1 through 5 thickened and elongated) present.                  Vascular Exam     Right Pulses  Dorsalis Pedis:      1+  Posterior Tibial:      1+        Left Pulses  Dorsalis Pedis:      1+  Posterior Tibial:      1+           Imaging:             Assessment:       1. Onychogryphosis    2. Nail problem    3. Bilateral lower extremity edema    4. Onychomycosis due to dermatophyte      Plan:   Onychogryphosis    Nail problem  -     Ambulatory referral/consult to Podiatry    Bilateral lower extremity edema    Onychomycosis due to dermatophyte      Follow up if symptoms worsen or fail to improve.    Procedures        Fungal infection of toenails explained. Treatment options including no treatment, periodic debridement, topical medications, oral medications, and removal of the nail were discussed, as well as success rates and risks of recurrence. We agreed on periodic debridement .  I trimmed the nails today as a courtesy.  I gave them recommendations for future treatment as we do not perform routine nail care.      Counseling:     I provided patient education verbally regarding:   Patient diagnosis, treatment options, as well as alternatives, risks, and benefits.     This note was created using Dragon voice recognition software that occasionally misinterpreted phrases or words.                      [1]   Current Outpatient Medications   Medication Sig Dispense Refill    acetaminophen (TYLENOL) 325 MG tablet Take 325 mg by mouth every 6 (six) hours as needed (pain/headache).      amlodipine (NORVASC) 5 MG tablet Take 2.5 mg by mouth once daily.  1    aspirin 81 MG Chew Take 81 mg by mouth once daily.      diclofenac sodium (VOLTAREN) 1 % Gel Apply 2 g topically once daily. 450 each 2    donepeziL (ARICEPT) 5 MG tablet Take 5 mg by mouth every evening.      gabapentin (NEURONTIN) 300 MG capsule Take 300 mg by mouth every evening.  6    LIDOcaine (LIDODERM) 5 % Place 1-2 patches onto the skin daily as needed (pain).      linaCLOtide (LINZESS) 72 mcg Cap capsule Take 1 capsule (72 mcg total) by mouth before breakfast. 30 capsule 2    omeprazole (PRILOSEC) 20 MG capsule Take 20 mg by mouth once daily.  3    oxybutynin (DITROPAN XL) 15 MG TR24 Take 15 mg by mouth  once daily.      ramipril (ALTACE) 10 MG capsule Take 10 mg by mouth once daily.  1    traZODone (DESYREL) 50 MG tablet Take 1 tablet (50 mg total) by mouth nightly as needed for Insomnia. 90 tablet 3     No current facility-administered medications for this visit.

## 2025-07-10 ENCOUNTER — TELEPHONE (OUTPATIENT)
Dept: PHARMACY | Facility: CLINIC | Age: OVER 89
End: 2025-07-10
Payer: MEDICARE

## 2025-07-10 NOTE — TELEPHONE ENCOUNTER
[facility]/Population Health Chart Review & Patient Outreach Details For Medication Adherence Project    Reason for Outreach Encounter: 3rd Party payor non-compliance report ([employer], [employer], [employer], etc)  2.  Patient Outreach Method: Reviewed patient chart  and [employer] message  3.   Medication in question:    Hypertension Medications              amlodipine (NORVASC) 5 MG tablet Take 2.5 mg by mouth once daily.    ramipril (ALTACE) 10 MG capsule Take 10 mg by mouth once daily.                 LF 90 ds 2/27/25    4.  Reviewed and or Updates Made To: Patient Chart  5. Outreach Outcomes and/or actions taken: Sent inquiry to patient: Waiting for response  Additional Notes:

## 2025-08-13 ENCOUNTER — HOSPITAL ENCOUNTER (OUTPATIENT)
Dept: RADIOLOGY | Facility: HOSPITAL | Age: OVER 89
Discharge: HOME | End: 2025-08-13
Attending: FAMILY MEDICINE
Payer: MEDICARE

## 2025-08-13 DIAGNOSIS — M54.2 NECK PAIN, MUSCULOSKELETAL: ICD-10-CM

## 2025-08-13 PROCEDURE — 72040 X-RAY EXAM NECK SPINE 2-3 VW: CPT | Mod: 26,HCNC,, | Performed by: RADIOLOGY

## 2025-08-13 PROCEDURE — 72040 X-RAY EXAM NECK SPINE 2-3 VW: CPT | Mod: TC,HCNC,PO

## 2025-08-14 ENCOUNTER — OFFICE VISIT (OUTPATIENT)
Dept: FAMILY MEDICINE | Facility: CLINIC | Age: OVER 89
End: 2025-08-14
Payer: MEDICARE

## 2025-08-14 VITALS
SYSTOLIC BLOOD PRESSURE: 110 MMHG | HEIGHT: 65 IN | DIASTOLIC BLOOD PRESSURE: 52 MMHG | WEIGHT: 117.94 LBS | HEART RATE: 70 BPM | OXYGEN SATURATION: 96 % | BODY MASS INDEX: 19.65 KG/M2

## 2025-08-14 DIAGNOSIS — D64.9 ANEMIA, UNSPECIFIED TYPE: ICD-10-CM

## 2025-08-14 DIAGNOSIS — E78.49 OTHER HYPERLIPIDEMIA: ICD-10-CM

## 2025-08-14 DIAGNOSIS — G30.9 MAJOR NEUROCOGNITIVE DISORDER DUE TO ALZHEIMER DISEASE, WITH BEHAVIORAL DISTURBANCE: Primary | ICD-10-CM

## 2025-08-14 DIAGNOSIS — N28.9 ABNORMAL KIDNEY FUNCTION: ICD-10-CM

## 2025-08-14 DIAGNOSIS — I10 ESSENTIAL HYPERTENSION: ICD-10-CM

## 2025-08-14 DIAGNOSIS — R53.1 WEAKNESS: ICD-10-CM

## 2025-08-14 DIAGNOSIS — F02.818 MAJOR NEUROCOGNITIVE DISORDER DUE TO ALZHEIMER DISEASE, WITH BEHAVIORAL DISTURBANCE: Primary | ICD-10-CM

## 2025-08-14 PROCEDURE — G2211 COMPLEX E/M VISIT ADD ON: HCPCS | Mod: HCNC,S$GLB,, | Performed by: FAMILY MEDICINE

## 2025-08-14 PROCEDURE — 99999 PR PBB SHADOW E&M-EST. PATIENT-LVL III: CPT | Mod: PBBFAC,HCNC,, | Performed by: FAMILY MEDICINE

## 2025-08-14 PROCEDURE — 1159F MED LIST DOCD IN RCRD: CPT | Mod: CPTII,HCNC,S$GLB, | Performed by: FAMILY MEDICINE

## 2025-08-14 PROCEDURE — 99214 OFFICE O/P EST MOD 30 MIN: CPT | Mod: HCNC,S$GLB,, | Performed by: FAMILY MEDICINE

## 2025-08-14 PROCEDURE — 3288F FALL RISK ASSESSMENT DOCD: CPT | Mod: CPTII,HCNC,S$GLB, | Performed by: FAMILY MEDICINE

## 2025-08-14 PROCEDURE — 1126F AMNT PAIN NOTED NONE PRSNT: CPT | Mod: CPTII,HCNC,S$GLB, | Performed by: FAMILY MEDICINE

## 2025-08-14 PROCEDURE — 1101F PT FALLS ASSESS-DOCD LE1/YR: CPT | Mod: CPTII,HCNC,S$GLB, | Performed by: FAMILY MEDICINE

## 2025-08-14 RX ORDER — BREXPIPRAZOLE 0.5 MG/1
1 TABLET ORAL DAILY
Qty: 30 TABLET | Refills: 2 | Status: SHIPPED | OUTPATIENT
Start: 2025-08-14

## 2025-08-15 ENCOUNTER — TELEPHONE (OUTPATIENT)
Dept: FAMILY MEDICINE | Facility: CLINIC | Age: OVER 89
End: 2025-08-15
Payer: MEDICARE

## 2025-08-15 PROBLEM — D64.9 ANEMIA: Status: ACTIVE | Noted: 2025-08-15

## 2025-08-15 PROBLEM — N28.9 ABNORMAL KIDNEY FUNCTION: Status: ACTIVE | Noted: 2025-08-15

## 2025-08-15 PROBLEM — I10 ESSENTIAL HYPERTENSION: Status: ACTIVE | Noted: 2025-08-15

## 2025-08-21 ENCOUNTER — OFFICE VISIT (OUTPATIENT)
Dept: OPTOMETRY | Facility: CLINIC | Age: OVER 89
End: 2025-08-21
Payer: MEDICARE

## 2025-08-21 ENCOUNTER — LAB VISIT (OUTPATIENT)
Dept: LAB | Facility: HOSPITAL | Age: OVER 89
End: 2025-08-21
Attending: FAMILY MEDICINE
Payer: MEDICARE

## 2025-08-21 DIAGNOSIS — H35.89 MACULAR RPE MOTTLING: Primary | ICD-10-CM

## 2025-08-21 DIAGNOSIS — D64.9 ANEMIA, UNSPECIFIED TYPE: ICD-10-CM

## 2025-08-21 DIAGNOSIS — N28.9 ABNORMAL KIDNEY FUNCTION: ICD-10-CM

## 2025-08-21 DIAGNOSIS — H47.20 OPTIC ATROPHY OF BOTH EYES: ICD-10-CM

## 2025-08-21 DIAGNOSIS — Z96.1 PSEUDOPHAKIA, LEFT EYE: ICD-10-CM

## 2025-08-21 DIAGNOSIS — H52.7 REFRACTIVE ERROR: ICD-10-CM

## 2025-08-21 DIAGNOSIS — I10 ESSENTIAL HYPERTENSION: ICD-10-CM

## 2025-08-21 LAB
ALBUMIN/CREAT UR: 12.3 UG/MG
ANION GAP (OHS): 10 MMOL/L (ref 8–16)
BUN SERPL-MCNC: 10 MG/DL (ref 10–30)
CALCIUM SERPL-MCNC: 9.8 MG/DL (ref 8.7–10.5)
CHLORIDE SERPL-SCNC: 107 MMOL/L (ref 95–110)
CO2 SERPL-SCNC: 26 MMOL/L (ref 23–29)
CREAT SERPL-MCNC: 0.8 MG/DL (ref 0.5–1.4)
CREAT UR-MCNC: 138 MG/DL (ref 15–325)
FERRITIN SERPL-MCNC: 21 NG/ML (ref 20–300)
GFR SERPLBLD CREATININE-BSD FMLA CKD-EPI: >60 ML/MIN/1.73/M2
GLUCOSE SERPL-MCNC: 77 MG/DL (ref 70–110)
IRON SATN MFR SERPL: 32 % (ref 20–50)
IRON SERPL-MCNC: 145 UG/DL (ref 30–160)
MICROALBUMIN UR-MCNC: 17 UG/ML
POTASSIUM SERPL-SCNC: 4.9 MMOL/L (ref 3.5–5.1)
SODIUM SERPL-SCNC: 143 MMOL/L (ref 136–145)
TIBC SERPL-MCNC: 460 UG/DL (ref 250–450)
TRANSFERRIN SERPL-MCNC: 311 MG/DL (ref 200–375)

## 2025-08-21 PROCEDURE — 92015 DETERMINE REFRACTIVE STATE: CPT | Mod: HCNC,S$GLB,, | Performed by: OPTOMETRIST

## 2025-08-21 PROCEDURE — 1159F MED LIST DOCD IN RCRD: CPT | Mod: CPTII,HCNC,S$GLB, | Performed by: OPTOMETRIST

## 2025-08-21 PROCEDURE — 82728 ASSAY OF FERRITIN: CPT | Mod: HCNC

## 2025-08-21 PROCEDURE — 99999 PR PBB SHADOW E&M-EST. PATIENT-LVL III: CPT | Mod: PBBFAC,HCNC,, | Performed by: OPTOMETRIST

## 2025-08-21 PROCEDURE — 1160F RVW MEDS BY RX/DR IN RCRD: CPT | Mod: CPTII,HCNC,S$GLB, | Performed by: OPTOMETRIST

## 2025-08-21 PROCEDURE — 84466 ASSAY OF TRANSFERRIN: CPT | Mod: HCNC

## 2025-08-21 PROCEDURE — 92004 COMPRE OPH EXAM NEW PT 1/>: CPT | Mod: HCNC,S$GLB,, | Performed by: OPTOMETRIST

## 2025-08-21 PROCEDURE — 36415 COLL VENOUS BLD VENIPUNCTURE: CPT | Mod: HCNC,PO

## 2025-08-21 PROCEDURE — 82570 ASSAY OF URINE CREATININE: CPT | Mod: HCNC

## 2025-08-21 PROCEDURE — 1126F AMNT PAIN NOTED NONE PRSNT: CPT | Mod: CPTII,HCNC,S$GLB, | Performed by: OPTOMETRIST

## 2025-08-21 PROCEDURE — 80048 BASIC METABOLIC PNL TOTAL CA: CPT | Mod: HCNC
